# Patient Record
Sex: FEMALE | Race: BLACK OR AFRICAN AMERICAN | Employment: UNEMPLOYED | URBAN - METROPOLITAN AREA
[De-identification: names, ages, dates, MRNs, and addresses within clinical notes are randomized per-mention and may not be internally consistent; named-entity substitution may affect disease eponyms.]

---

## 2017-04-18 ENCOUNTER — OP HISTORICAL/CONVERTED ENCOUNTER (OUTPATIENT)
Dept: OTHER | Age: 57
End: 2017-04-18

## 2017-05-15 ENCOUNTER — OP HISTORICAL/CONVERTED ENCOUNTER (OUTPATIENT)
Dept: OTHER | Age: 57
End: 2017-05-15

## 2017-06-11 ENCOUNTER — OP HISTORICAL/CONVERTED ENCOUNTER (OUTPATIENT)
Dept: OTHER | Age: 57
End: 2017-06-11

## 2017-11-20 ENCOUNTER — OP HISTORICAL/CONVERTED ENCOUNTER (OUTPATIENT)
Dept: OTHER | Age: 57
End: 2017-11-20

## 2018-09-27 ENCOUNTER — OP HISTORICAL/CONVERTED ENCOUNTER (OUTPATIENT)
Dept: OTHER | Age: 58
End: 2018-09-27

## 2018-10-02 ENCOUNTER — IP HISTORICAL/CONVERTED ENCOUNTER (OUTPATIENT)
Dept: OTHER | Age: 58
End: 2018-10-02

## 2019-01-23 ENCOUNTER — OP HISTORICAL/CONVERTED ENCOUNTER (OUTPATIENT)
Dept: OTHER | Age: 59
End: 2019-01-23

## 2019-11-07 ENCOUNTER — ED HISTORICAL/CONVERTED ENCOUNTER (OUTPATIENT)
Dept: OTHER | Age: 59
End: 2019-11-07

## 2019-12-02 ENCOUNTER — OP HISTORICAL/CONVERTED ENCOUNTER (OUTPATIENT)
Dept: OTHER | Age: 59
End: 2019-12-02

## 2020-01-21 ENCOUNTER — OP HISTORICAL/CONVERTED ENCOUNTER (OUTPATIENT)
Dept: OTHER | Age: 60
End: 2020-01-21

## 2020-01-22 ENCOUNTER — ED HISTORICAL/CONVERTED ENCOUNTER (OUTPATIENT)
Dept: OTHER | Age: 60
End: 2020-01-22

## 2020-09-16 ENCOUNTER — HOSPITAL ENCOUNTER (EMERGENCY)
Age: 60
Discharge: HOME | End: 2020-09-16
Attending: FAMILY MEDICINE
Payer: COMMERCIAL

## 2020-09-16 VITALS
HEIGHT: 63 IN | WEIGHT: 229.28 LBS | TEMPERATURE: 98.7 F | BODY MASS INDEX: 40.62 KG/M2 | RESPIRATION RATE: 18 BRPM | SYSTOLIC BLOOD PRESSURE: 177 MMHG | OXYGEN SATURATION: 97 % | DIASTOLIC BLOOD PRESSURE: 83 MMHG | HEART RATE: 66 BPM

## 2020-09-16 DIAGNOSIS — S16.1XXA ACUTE CERVICAL MYOFASCIAL STRAIN, INITIAL ENCOUNTER: ICD-10-CM
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
## 2020-09-16 PROCEDURE — 99283 EMERGENCY DEPT VISIT LOW MDM: CPT

## 2020-09-16 PROCEDURE — 96372 THER/PROPH/DIAG INJ SC/IM: CPT

## 2020-09-16 PROCEDURE — 74011250636 HC RX REV CODE- 250/636: Performed by: FAMILY MEDICINE

## 2020-09-16 RX ORDER — ORPHENADRINE CITRATE 30 MG/ML
60 INJECTION INTRAMUSCULAR; INTRAVENOUS ONCE
Status: COMPLETED | OUTPATIENT
Start: 2020-09-16 | End: 2020-09-16

## 2020-09-16 RX ORDER — ORPHENADRINE CITRATE 100 MG/1
100 TABLET, EXTENDED RELEASE ORAL 2 TIMES DAILY
Qty: 30 TAB | Refills: 0 | Status: SHIPPED | OUTPATIENT
Start: 2020-09-16 | End: 2020-10-01

## 2020-09-16 RX ADMIN — ORPHENADRINE CITRATE 60 MG: 30 INJECTION INTRAMUSCULAR; INTRAVENOUS at 11:04
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
## 2020-09-17 NOTE — ED NOTES
Received multiple calls from patient that her prescription was not at the pharmacy. Called [facility] on [address] in [place], spoke with the pharmacist, advised that the Norflex prescribed by Dr. Anthony Almaguer was not covered under her insurance. Discussed with Dr. Sabine Fitch who is the physician on duty the problem with the RX. Prescription changed to Flexeril 10 mg one tid for 15 days dispense #45 with no refills given to pharmacist in place of the Norflex.  Patient notified of the change of the RX

## 2020-09-27 ENCOUNTER — HOSPITAL ENCOUNTER (EMERGENCY)
Age: 60
Discharge: HOME | End: 2020-09-27
Attending: INTERNAL MEDICINE
Payer: COMMERCIAL

## 2020-09-27 VITALS
HEIGHT: 63 IN | SYSTOLIC BLOOD PRESSURE: 177 MMHG | BODY MASS INDEX: 41.64 KG/M2 | WEIGHT: 235 LBS | HEART RATE: 80 BPM | OXYGEN SATURATION: 98 % | RESPIRATION RATE: 16 BRPM | DIASTOLIC BLOOD PRESSURE: 105 MMHG | TEMPERATURE: 98.5 F

## 2020-09-27 DIAGNOSIS — M54.2 NECK PAIN: Primary | ICD-10-CM

## 2020-09-27 PROCEDURE — 99282 EMERGENCY DEPT VISIT SF MDM: CPT

## 2020-09-27 RX ORDER — HYDROCODONE BITARTRATE AND ACETAMINOPHEN 5; 325 MG/1; MG/1
1 TABLET ORAL
Qty: 12 TAB | Refills: 0 | Status: SHIPPED | OUTPATIENT
Start: 2020-09-27 | End: 2020-09-30
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
## 2020-11-11 ENCOUNTER — APPOINTMENT (OUTPATIENT)
Dept: PHYSICAL THERAPY | Age: 60
End: 2020-11-11
Payer: MEDICARE

## 2020-11-13 ENCOUNTER — HOSPITAL ENCOUNTER (OUTPATIENT)
Dept: PHYSICAL THERAPY | Age: 60
Discharge: HOME | End: 2020-11-13
Payer: MEDICARE

## 2020-11-13 PROCEDURE — 97163 PT EVAL HIGH COMPLEX 45 MIN: CPT
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
## 2020-11-24 ENCOUNTER — APPOINTMENT (OUTPATIENT)
Dept: PHYSICAL THERAPY | Age: 60
End: 2020-11-24
Payer: MEDICARE

## 2020-11-27 ENCOUNTER — HOSPITAL ENCOUNTER (OUTPATIENT)
Dept: PHYSICAL THERAPY | Age: 60
Discharge: HOME | End: 2020-11-27
Payer: MEDICARE

## 2020-11-27 PROCEDURE — 97140 MANUAL THERAPY 1/> REGIONS: CPT

## 2020-11-27 PROCEDURE — 97110 THERAPEUTIC EXERCISES: CPT

## 2020-11-27 NOTE — PROGRESS NOTES
PT DAILY TREATMENT NOTE - [facility]     Patient Name: Quinn Looney  Date:2020  : 1960  [x]  Patient  Verified  Payor: [facility] MEDICARE / Plan: [facility] CCP / Product Type: Managed Care Medicare /    Treatment Area: Low back pain [M54.5]   Next MD APPT:   In time:11:33  Out time:12:23pm  Total Treatment Time (min): 50  Total Timed Codes (min): 40  1:1 Treatment Time ( only): 40  Visit #: 2 Visit count could not be calculated. Make sure you are using a visit which is associated with an episode. SUBJECTIVE  Pain Level (0-10 scale) pre treatment: 6/10 Any medication changes, allergies to medications, adverse drug reactions, diagnosis change, or new procedure performed?: [x] No    [] Yes (see summary sheet for update)  Subjective functional status/changes:   [] No changes reported  Patient reports pain localized to right side of her lower lumbar area. Patient report increased stiffness but she tries to move as much as she can at home. Patient presents with sight antalgic gait pattern, left side lean. OBJECTIVE    Modality rationale: decrease edema, decrease inflammation, decrease pain, increase tissue extensibility and increase muscle contraction/control to improve the patients ability to move lumbar with decrease pain.    Min Type Additional Details    [] Estim: []UnAtt   []Att       []TENS instruct                  []IFC  []Premod   []NMES                     []Other:  []w/US   []w/ice   []w/heat  Position:  Location:   10 []  Ice     [x]  Heat  []  Ice massage Position:left side lying   Location:Right lower lumbar area    []  Traction: [] Cervical       []Lumbar                       [] Prone          []Supine                       []Intermittent   []Continuous Lbs:  []w/heat  []W/heat and Estim    []  Ultrasound: []Continuous   [] Pulsed at:                           []1MHz   []3MHz Location:  W/cm2:      [] Skin assessment post-treatment:   []intact  []redness- no adverse reaction   []redness - adverse reaction:   30 min Therapeutic Exercise:  [x] See flow sheet :   Rationale: increase ROM, increase strength and improve coordination to improve the patients ability to move lumbar area without pain/discomofrt and reduce stiffness. 10 min Manual Therapy: Right lumbar area    Rationale: decrease pain, increase ROM, increase tissue extensibility and decrease trigger points to improve the patients ability to move back in pain free range. With   [x] TE   [] TA   [] neuro   [] other: Patient Education: [] Review HEP    [] Progressed/Changed HEP based on:   [] positioning   [] body mechanics   [] transfers   [] heat/ice application    [] other:      Other Objective/Functional Measures: There-ex were initiated  Patient reports mild discomfort on right lumbar area with PPT and required AA      Pain Level (0-10 scale) post treatment: 4-5/10      ASSESSMENT/Changes in Function:   Patient there-ex were initiated she required increased time and hand one with activities, patient hesitant of movement, reports some discomort and pulling sensation with some exercises, therapist progressed slowly and as tolerated. Patient tolerated gentle soft tissue mobilization to right lumbar area, increased trigger points and TTP noted from right side paraspinals L3-L5 area. Patient encouraged to do anterior trunk flex stretch at home, reports pain relief post heat. May benefit of incorporating wheel for lumbar stretch. Patient will continue to benefit from skilled PT services to modify and progress therapeutic interventions, address functional mobility deficits, address ROM deficits, address strength deficits, analyze and address soft tissue restrictions, analyze and cue movement patterns, analyze and modify body mechanics/ergonomics and assess and modify postural abnormalities to attain remaining goals.    []  See Plan of Care  []  See progress note/recertification  []  See Discharge Summary GOALS/Progress towards goals:    Short Term Goals: To be accomplished in 6 treatments:              1. Pt will be independent with HEP []? Met  []? Not Met []? Partially Met              2.  Decrease pain by at least 2 points on the VAS []? Met  []? Not Met []? Partially Met              3. Pt will demonstrate improved lumbar ROM in FF with decreased pain standing upright, Rotation and lateral flexion to minimal limitation []? Met  []? Not Met []? Partially Met                Long Term Goals: To be accomplished in 12 treatments:              1.  Restore Lumbar ROM to no more than minimal limitations without pain []? Met  []? Not Met []? Partially Met              2. Pt will be able to perform a SLR to at least 60 degrees without pain. []? Met  []? Not Met []? Partially Met              3. Pt will report improvement in ability to do IADLs without pain </=2/10 []? Met  []? Not Met []?  Partially Met     PLAN  []  Upgrade activities as tolerated     [x]  Continue plan of care  []  Update interventions per flow sheet       []  Discharge due to:_  []  Other:_      Candi Dailey, PT 11/27/2020

## 2020-12-01 ENCOUNTER — HOSPITAL ENCOUNTER (OUTPATIENT)
Dept: PHYSICAL THERAPY | Age: 60
Discharge: HOME | End: 2020-12-01
Payer: MEDICARE

## 2020-12-01 PROCEDURE — 97014 ELECTRIC STIMULATION THERAPY: CPT

## 2020-12-01 PROCEDURE — 97110 THERAPEUTIC EXERCISES: CPT

## 2020-12-01 PROCEDURE — 97140 MANUAL THERAPY 1/> REGIONS: CPT

## 2020-12-01 NOTE — PROGRESS NOTES
PT DAILY TREATMENT NOTE - [facility]     Patient Name: Yaneth Hernandez  Date:2020  : 1960  [x]  Patient  Verified  Payor: [facility] MEDICARE / Plan: [facility] [address] [facility] CCP / Product Type: Managed Care Medicare /    Treatment Area: Low back pain [M54.5]   Next MD APPT:   In time: 15:22pm  Out time:16:25pm  Total Treatment Time (min): 60  Total Timed Codes (min): 40  1:1 Treatment Time ( only): 40  Visit #: 2 Visit count could not be calculated. Make sure you are using a visit which is associated with an episode. SUBJECTIVE  Pain Level (0-10 scale) pre treatment: 7/10 Any medication changes, allergies to medications, adverse drug reactions, diagnosis change, or new procedure performed?: [x] No    [] Yes (see summary sheet for update)  Subjective functional status/changes:   [] No changes reported  Patient reports she was sore after last session and has pain with movement, but she's been trying to do her exercises. She also reports having stiffness in at night and in the mornings around her lower lumbar area. OBJECTIVE    Modality rationale: decrease edema, decrease inflammation, decrease pain, increase tissue extensibility and increase muscle contraction/control to improve the patients ability to move lumbar with decrease pain.    Min Type Additional Details   20 [x] Estim: [x]UnAtt   []Att       []TENS instruct                  []IFC  []Premod   []NMES                     []Other:  []w/US   []w/ice   []w/heat  Position:left side lying   Location:right lower lumbar area    []  Ice     []  Heat  []  Ice massage Position:  Location    []  Traction: [] Cervical       []Lumbar                       [] Prone          []Supine                       []Intermittent   []Continuous Lbs:  []w/heat  []W/heat and Estim    []  Ultrasound: []Continuous   [] Pulsed at:                           []1MHz   []3MHz Location:  W/cm2:      [] Skin assessment post-treatment:   []intact  []redness- no adverse reaction []redness - adverse reaction:   30 min Therapeutic Exercise:  [x] See flow sheet :   Rationale: increase ROM, increase strength and improve coordination to improve the patients ability to move lumbar area without pain/discomofrt and reduce stiffness. 10 min Manual Therapy: Right lumbar area    Rationale: decrease pain, increase ROM, increase tissue extensibility and decrease trigger points to improve the patients ability to move back in pain free range. With   [x] TE   [] TA   [] neuro   [] other: Patient Education: [] Review HEP    [] Progressed/Changed HEP based on:   [] positioning   [] body mechanics   [] transfers   [] heat/ice application    [] other:      Other Objective/Functional Measures:  Patient reports mild discomfort on right lumbar area with R SLR and right knee to chest.       Pain Level (0-10 scale) post treatment: 4/10      ASSESSMENT/Changes in Function:   Patient tolerated activities fairly well, continues to presents with decreased endurance on the bike with report of LE fatigue and requires occasional AA PPT and SLR for technique. Patient overall presents with general weakness and she requires time. Patient also still hesitant of movement, reports some discomort and pulling sensation with some exercises, therapist progressed slowly and as tolerated. Patient required assist with knee to chest and tolerated better activity in sidelying. Patient tolerated gentle soft tissue mobilization to right lumbar area, increased trigger points and TTP noted from right side paraspinals L3-L5 area. Patient encouraged to do anterior trunk flex stretch at home, reports pain relief post heat.  Patient will continue to benefit from skilled PT services to modify and progress therapeutic interventions, address functional mobility deficits, address ROM deficits, address strength deficits, analyze and address soft tissue restrictions, analyze and cue movement patterns, analyze and modify body mechanics/ergonomics and assess and modify postural abnormalities to attain remaining goals. []  See Plan of Care  []  See progress note/recertification  []  See Discharge Summary         GOALS/Progress towards goals:    Short Term Goals: To be accomplished in 6 treatments:              1. Pt will be independent with HEP []? Met  []? Not Met []? Partially Met              2.  Decrease pain by at least 2 points on the VAS []? Met  []? Not Met []? Partially Met              3. Pt will demonstrate improved lumbar ROM in FF with decreased pain standing upright, Rotation and lateral flexion to minimal limitation []? Met  []? Not Met []? Partially Met                Long Term Goals: To be accomplished in 12 treatments:              1.  Restore Lumbar ROM to no more than minimal limitations without pain []? Met  []? Not Met []? Partially Met              2. Pt will be able to perform a SLR to at least 60 degrees without pain. []? Met  []? Not Met []? Partially Met              3. Pt will report improvement in ability to do IADLs without pain </=2/10 []? Met  []? Not Met []?  Partially Met     PLAN  []  Upgrade activities as tolerated     [x]  Continue plan of care  []  Update interventions per flow sheet       []  Discharge due to:_  []  Other:_      Jossy Larry, PT 12/1/2020

## 2020-12-04 ENCOUNTER — HOSPITAL ENCOUNTER (OUTPATIENT)
Dept: PHYSICAL THERAPY | Age: 60
Discharge: HOME | End: 2020-12-04
Payer: MEDICARE

## 2020-12-04 PROCEDURE — 97110 THERAPEUTIC EXERCISES: CPT

## 2020-12-04 PROCEDURE — 97530 THERAPEUTIC ACTIVITIES: CPT

## 2020-12-04 PROCEDURE — 97140 MANUAL THERAPY 1/> REGIONS: CPT

## 2020-12-04 PROCEDURE — 97014 ELECTRIC STIMULATION THERAPY: CPT

## 2020-12-04 NOTE — PROGRESS NOTES
PT DAILY TREATMENT NOTE - [facility]     Patient Name: Ermias Doing  Date:2020  : 1960  [x]  Patient  Verified  Payor: [facility] / Plan: [facility] [address] [facility] CCP / Product Type: Managed Care Medicare /    Treatment Area: Low back pain [M54.5]   Next MD APPT:   In time: 15:16pm  Out time:16:20 pm  Total Treatment Time (min): 60  Total Timed Codes (min): 40  1:1 Treatment Time ( only): 40  Visit #: 2 Visit count could not be calculated. Make sure you are using a visit which is associated with an episode. SUBJECTIVE  Pain Level (0-10 scale) pre treatment: 4/10 Any medication changes, allergies to medications, adverse drug reactions, diagnosis change, or new procedure performed?: [x] No    [] Yes (see summary sheet for update)  Subjective functional status/changes:   [] No changes reported  Patient stated she is feeling better, pain is subsiding also stated the stiffness is subsiding and she is not feeling that bad. Stated she was able to get on the bike at home and was having less discomfort. OBJECTIVE    Modality rationale: decrease edema, decrease inflammation, decrease pain, increase tissue extensibility and increase muscle contraction/control to improve the patients ability to move lumbar with decrease pain.    Min Type Additional Details   20 [x] Estim: [x]UnAtt   []Att       []TENS instruct                  []IFC  []Premod   []NMES                     []Other:  []w/US   []w/ice   []w/heat  Position:left side lying   Location:right lower lumbar area    []  Ice     []  Heat  []  Ice massage Position:  Location    []  Traction: [] Cervical       []Lumbar                       [] Prone          []Supine                       []Intermittent   []Continuous Lbs:  []w/heat  []W/heat and Estim    []  Ultrasound: []Continuous   [] Pulsed at:                           []1MHz   []3MHz Location:  W/cm2:      [] Skin assessment post-treatment:   []intact  []redness- no adverse reaction []redness - adverse reaction:   30 min Therapeutic Exercise:  [x] See flow sheet :   Rationale: increase ROM, increase strength and improve coordination to improve the patients ability to move lumbar area without pain/discomofrt and reduce stiffness. 10 min Manual Therapy: Right lumbar area    Rationale: decrease pain, increase ROM, increase tissue extensibility and decrease trigger points to improve the patients ability to move back in pain free range. With   [x] TE   [] TA   [] neuro   [] other: Patient Education: [] Review HEP    [] Progressed/Changed HEP based on:   [] positioning   [] body mechanics   [] transfers   [] heat/ice application    [] other:      Other Objective/Functional Measures:    Pain Level (0-10 scale) post treatment: 2-3/10      ASSESSMENT/Changes in Function:   Patient was more energetic today, tolerated bike and exercise with less discomfort stated she is feeling better and happy that pain is subsiding. Patient continues to present with difficulty with knee to chest on R>L side stated its more painful tolerated side lying knee to chest better. . Patient tolerated gentle soft tissue mobilization to right lumbar area, increased trigger points and TTP noted from right side paraspinals L3-L5 area and sacrum. Encouraged to do exercises and patient reports pain relief post heat and ES. Patient will continue to benefit from skilled PT services to modify and progress therapeutic interventions, address functional mobility deficits, address ROM deficits, address strength deficits, analyze and address soft tissue restrictions, analyze and cue movement patterns, analyze and modify body mechanics/ergonomics and assess and modify postural abnormalities to attain remaining goals. []  See Plan of Care  []  See progress note/recertification  []  See Discharge Summary         GOALS/Progress towards goals:    Short Term Goals:  To be accomplished in 6 treatments:              1. Pt will be independent with HEP []? Met  []? Not Met []? Partially Met              2.  Decrease pain by at least 2 points on the VAS []? Met  []? Not Met []? Partially Met              3. Pt will demonstrate improved lumbar ROM in FF with decreased pain standing upright, Rotation and lateral flexion to minimal limitation []? Met  []? Not Met []? Partially Met                Long Term Goals: To be accomplished in 12 treatments:              1.  Restore Lumbar ROM to no more than minimal limitations without pain []? Met  []? Not Met []? Partially Met              2. Pt will be able to perform a SLR to at least 60 degrees without pain. []? Met  []? Not Met []? Partially Met              3. Pt will report improvement in ability to do IADLs without pain </=2/10 []? Met  []? Not Met []?  Partially Met     PLAN  []  Upgrade activities as tolerated     [x]  Continue plan of care  []  Update interventions per flow sheet       []  Discharge due to:_  []  Other:_      Rakesh Restrepo, PT 12/4/2020

## 2020-12-08 ENCOUNTER — HOSPITAL ENCOUNTER (OUTPATIENT)
Dept: PHYSICAL THERAPY | Age: 60
Discharge: HOME | End: 2020-12-08
Payer: MEDICARE

## 2020-12-08 PROCEDURE — 97110 THERAPEUTIC EXERCISES: CPT

## 2020-12-08 PROCEDURE — 97140 MANUAL THERAPY 1/> REGIONS: CPT

## 2020-12-08 PROCEDURE — 97014 ELECTRIC STIMULATION THERAPY: CPT

## 2020-12-08 NOTE — PROGRESS NOTES
PT DAILY TREATMENT NOTE - [facility]     Patient Name: Augustus Lennox  Date:2020  : 1960  [x]  Patient  Verified  Payor: [facility] MEDICARE / Plan: [facility] [address] [facility] CCP / Product Type: Managed Care Medicare /    Treatment Area: Low back pain [M54.5]   Next MD APPT:   In time: 1:03 pm  Out time:2:05 pm  Total Treatment Time (min): 62  Total Timed Codes (min): 40  1:1 Treatment Time ( only): 40  Visit #: 2 Visit count could not be calculated. Make sure you are using a visit which is associated with an episode. SUBJECTIVE  Pain Level (0-10 scale) pre treatment: 4/10 Any medication changes, allergies to medications, adverse drug reactions, diagnosis change, or new procedure performed?: [x] No    [] Yes (see summary sheet for update)  Subjective functional status/changes:   [] No changes reported  Patient stated she is feeling better, she has some times good day and some times bad days but overall she reports about 70% improvement since start of therapy. OBJECTIVE    Modality rationale: decrease edema, decrease inflammation, decrease pain, increase tissue extensibility and increase muscle contraction/control to improve the patients ability to move lumbar with decrease pain.    Min Type Additional Details   20 [x] Estim: [x]UnAtt   []Att       []TENS instruct                  []IFC  []Premod   []NMES                     []Other:  []w/US   []w/ice   []w/heat  Position:left side lying   Location:right lower lumbar area    []  Ice     []  Heat  []  Ice massage Position:  Location    []  Traction: [] Cervical       []Lumbar                       [] Prone          []Supine                       []Intermittent   []Continuous Lbs:  []w/heat  []W/heat and Estim    []  Ultrasound: []Continuous   [] Pulsed at:                           []1MHz   []3MHz Location:  W/cm2:      [] Skin assessment post-treatment:   []intact  []redness- no adverse reaction   []redness - adverse reaction:   30 min Therapeutic Exercise:  [x] See flow sheet :   Rationale: increase ROM, increase strength and improve coordination to improve the patients ability to move lumbar area without pain/discomofrt and reduce stiffness. 10 min Manual Therapy: Right lumbar area    Rationale: decrease pain, increase ROM, increase tissue extensibility and decrease trigger points to improve the patients ability to move back in pain free range. With   [x] TE   [] TA   [] neuro   [] other: Patient Education: [] Review HEP    [] Progressed/Changed HEP based on:   [] positioning   [] body mechanics   [] transfers   [] heat/ice application    [] other:      Other Objective/Functional Measures:  Difficulty with bridging     Physical Findings      Gait and Functional Mobility: more recipcal gait pattern  Palpation: TTP with LT throughout the R Gluteal area and LS paraspinals   Gross findings: Morbid obesity  Spine:   TRUNK ROM:      Flexion:                                  []? N/A  []? WNL  [x]? Minimal  []? Moderate  []? Major coming down on right side  Extension:                             []? N/A  []? WNL  []? Minimal  []? Moderate  [x]? Major    Right Lateral Flexion:           []? N/A  []? WNL  [x]? Minimal  []? Moderate  []? Major    Left Lateral Flexion:              []? N/A  []? WNL  []? Minimal  [x]? Moderate  []? Major   Rotation to the Right:           []? N/A  []? WNL  []? Minimal  [x]? Moderate  []? Major   Rotation to the Left:              []? N/A  []? WNL  [x]? Minimal  []? Moderate  []? Major   Right Side Glide:                   []? N/A  []? WNL  []? Minimal  []? Moderate  []? Major  Left Side Glide:                     []? N/A  []? WNL  []? Minimal  []? Moderate  []? Major  Additional comments: increased pain with flexion, left >right rotation and left side bending Improvement when  returning to upright from flexed position .   With FF pt deviated to the L    Specific joints: *normal values in ()     Hip                                AROM PROM                              MMT    R L R L R L   Flexion (120)         4  4   Extension (15)          2+  2+   Abduction (40)         3+P!  4   Adduction (30)          2-  2-   IR (40)               ER (40)               Additional comments: Increased hip strength noted, bridging is difficult but she is able to lift glut off mat, able to go onto prone and noted improvement in scooting.        Knee                                 AROM                          PROM                           MMT    R L R L R L   Extension (0)          4+ 5   Flexion (145)          4-  4-    Additional comments: Decreased effort on the R due to pain     Ankle                                 AROM                       PROM                             MMT    R L R L R L   Dorsiflexion (15)         4+ 5   Plantarflexion (50)               Additional comments:            Mobility Assessment: rolling to R/L side with increased time but no AA requied on narrow splint      Pain Level (0-10 scale) post treatment: 2-3/10      ASSESSMENT/Changes in Function:   Patient endurance level is improving, she tolerated bike x 6 min without rest, her bed mobilty is improving and she doesn't require assit for positioning on mat. Noted overall improvement with strength since evaluation however, she continues to presents with LE hip weakness on R>L, specially hip extensors. Patient continues to present with difficulty with knee to chest on R>L side stated its more painful tolerated side lying knee to chest better. . Patient tolerated gentle soft tissue mobilization to right lumbar/glut  area, increased trigger points and TTP noted from right side paraspinals L3-L5 area to sacrum. Encouraged to do exercises and patient reports pain relief post heat and ES.  Patient will continue to benefit from skilled PT services to modify and progress therapeutic interventions, address functional mobility deficits, address ROM deficits, address strength deficits, analyze and address soft tissue restrictions, analyze and cue movement patterns, analyze and modify body mechanics/ergonomics and assess and modify postural abnormalities to attain remaining goals. []  See Plan of Care  []  See progress note/recertification  []  See Discharge Summary         GOALS/Progress towards goals:    Short Term Goals: To be accomplished in 6 treatments:              1. Pt will be independent with HEP []? Met  []? Not Met []? Partially Met              2.  Decrease pain by at least 2 points on the VAS []? Met  []? Not Met []? Partially Met              3. Pt will demonstrate improved lumbar ROM in FF with decreased pain standing upright, Rotation and lateral flexion to minimal limitation []? Met  []? Not Met []? Partially Met                Long Term Goals: To be accomplished in 12 treatments:              1.  Restore Lumbar ROM to no more than minimal limitations without pain []? Met  []? Not Met []? Partially Met              2. Pt will be able to perform a SLR to at least 60 degrees without pain. []? Met  []? Not Met []? Partially Met              3. Pt will report improvement in ability to do IADLs without pain </=2/10 []? Met  []? Not Met []?  Partially Met     PLAN  []  Upgrade activities as tolerated     [x]  Continue plan of care  []  Update interventions per flow sheet       []  Discharge due to:_  []  Other:_      Andi Barcenas, PT 12/8/2020

## 2020-12-11 ENCOUNTER — HOSPITAL ENCOUNTER (OUTPATIENT)
Dept: PHYSICAL THERAPY | Age: 60
Discharge: HOME | End: 2020-12-11
Payer: MEDICARE

## 2020-12-11 PROCEDURE — 97140 MANUAL THERAPY 1/> REGIONS: CPT

## 2020-12-11 PROCEDURE — 97014 ELECTRIC STIMULATION THERAPY: CPT

## 2020-12-11 PROCEDURE — 97110 THERAPEUTIC EXERCISES: CPT

## 2020-12-11 NOTE — PROGRESS NOTES
PT DAILY TREATMENT NOTE - [facility]     Patient Name: Fer Speaker  Date:2020  : 1960  [x]  Patient  Verified  Payor: Misael Rebolledo / Plan: [facility] [place] [address] [facility] / Product Type: Managed Care Medicare /    Treatment Area: Low back pain [M54.5]   Next MD APPT:   In time: 1:03 pm  Out time:2:10  pm  Total Treatment Time (min): 65  Total Timed Codes (min): 45  1:1 Treatment Time ( only): 45  Visit #: 6 Visit count could not be calculated. Make sure you are using a visit which is associated with an episode. SUBJECTIVE  Pain Level (0-10 scale) pre treatment: 4/10 Any medication changes, allergies to medications, adverse drug reactions, diagnosis change, or new procedure performed?: [x] No    [] Yes (see summary sheet for update)  Subjective functional status/changes:   [] No changes reported  Patient stated she feels better, pain is about the same but not as sever as before, stated he neck still hurts and she will go to the MD to get a prescription since she's been calling the office and is not able to get through. OBJECTIVE    Modality rationale: decrease edema, decrease inflammation, decrease pain, increase tissue extensibility and increase muscle contraction/control to improve the patients ability to move lumbar with decrease pain.    Min Type Additional Details   20 [x] Estim: [x]UnAtt   []Att       []TENS instruct                  []IFC  []Premod   []NMES                     []Other:  []w/US   []w/ice   [x]w/heat  Position:left side lying   Location:right lower lumbar area    []  Ice     []  Heat  []  Ice massage Position:  Location    []  Traction: [] Cervical       []Lumbar                       [] Prone          []Supine                       []Intermittent   []Continuous Lbs:  []w/heat  []W/heat and Estim    []  Ultrasound: []Continuous   [] Pulsed at:                           []1MHz   []3MHz Location:  W/cm2:      [] Skin assessment post-treatment:   []intact  []redness- no adverse reaction   []redness - adverse reaction:   25 min Therapeutic Exercise:  [x] See flow sheet :   Rationale: increase ROM, increase strength and improve coordination to improve the patients ability to move lumbar area without pain/discomofrt and reduce stiffness. 20 min Manual Therapy: Right lumbar area    Rationale: decrease pain, increase ROM, increase tissue extensibility and decrease trigger points to improve the patients ability to move back in pain free range. With   [x] TE   [] TA   [] neuro   [] other: Patient Education: [] Review HEP    [] Progressed/Changed HEP based on:   [] positioning   [] body mechanics   [] transfers   [] heat/ice application    [] other:      Other Objective/Functional Measures:      Pain Level (0-10 scale) post treatment: 3/10      ASSESSMENT/Changes in Function:   Patient was having more discomfort with exercises today on the right side was not able to tolerate much of exercises, we focused more on the soft tissue mobilization. Then she realized she didn't take her pain medication before therapy today. Patient was encouraged to use pain meds prior to therapy to be able and tolerated exercises. Patient overall continues to presents with LE hip weakness on R>L, specially hip extensors. Patient continues to present with difficulty with knee to chest on R>L side stated its more painful. Patient tolerated gentle soft tissue mobilization to right lumbar/glut  area, increased trigger points and TTP noted from right side paraspinals L3-L5 area to sacrum. Encouraged to do exercises and patient reports pain relief post heat and ES.  Patient will continue to benefit from skilled PT services to modify and progress therapeutic interventions, address functional mobility deficits, address ROM deficits, address strength deficits, analyze and address soft tissue restrictions, analyze and cue movement patterns, analyze and modify body mechanics/ergonomics and assess and modify postural abnormalities to attain remaining goals. []  See Plan of Care  []  See progress note/recertification  []  See Discharge Summary         GOALS/Progress towards goals:    Short Term Goals: To be accomplished in 6 treatments:              1. Pt will be independent with HEP [x]? Met  []? Not Met []? Partially Met              2.  Decrease pain by at least 2 points on the VAS [x]? Met  []? Not Met []? Partially Met              3. Pt will demonstrate improved lumbar ROM in FF with decreased pain standing upright, Rotation and lateral flexion to minimal limitation []? Met  []? Not Met [x]? Partially Met                Long Term Goals: To be accomplished in 12 treatments:              1.  Restore Lumbar ROM to no more than minimal limitations without pain []? Met  []? Not Met []? Partially Met              2. Pt will be able to perform a SLR to at least 60 degrees without pain. []? Met  []? Not Met []? Partially Met              3. Pt will report improvement in ability to do IADLs without pain </=2/10 []? Met  []? Not Met []?  Partially Met     PLAN  []  Upgrade activities as tolerated     [x]  Continue plan of care  []  Update interventions per flow sheet       []  Discharge due to:_  []  Other:_      Adela Martinez, PT 12/11/2020

## 2020-12-15 ENCOUNTER — HOSPITAL ENCOUNTER (OUTPATIENT)
Dept: PHYSICAL THERAPY | Age: 60
Discharge: HOME | End: 2020-12-15
Payer: MEDICARE

## 2020-12-15 PROCEDURE — 97014 ELECTRIC STIMULATION THERAPY: CPT

## 2020-12-15 PROCEDURE — 97140 MANUAL THERAPY 1/> REGIONS: CPT

## 2020-12-15 PROCEDURE — 97110 THERAPEUTIC EXERCISES: CPT

## 2020-12-15 NOTE — PROGRESS NOTES
PT DAILY TREATMENT NOTE - [facility]     Patient Name: Beni Edmonds  Date:12/15/2020  : 1960  [x]  Patient  Verified  Payor: Luigi Gutierrez / Plan: [facility] [place] [address] [facility] CCP / Product Type: Managed Care Medicare /    Treatment Area: Low back pain [M54.5]   Next MD APPT:   In time: 1:03 pm  Out time:2:10  pm  Total Treatment Time (min): 65  Total Timed Codes (min): 45  1:1 Treatment Time ( only): 45  Visit #: 6 Visit count could not be calculated. Make sure you are using a visit which is associated with an episode. SUBJECTIVE  Pain Level (0-10 scale) pre treatment: 4/10 Any medication changes, allergies to medications, adverse drug reactions, diagnosis change, or new procedure performed?: [x] No    [] Yes (see summary sheet for update)  Subjective functional status/changes:   [] No changes reported  Patient stated she feels better today, she took her pain med this morning and has not much discomfort. OBJECTIVE    Modality rationale: decrease edema, decrease inflammation, decrease pain, increase tissue extensibility and increase muscle contraction/control to improve the patients ability to move lumbar with decrease pain.    Min Type Additional Details   20 [x] Estim: [x]UnAtt   []Att       []TENS instruct                  []IFC  []Premod   []NMES                     []Other:  []w/US   []w/ice   [x]w/heat  Position:left side lying   Location:right lower lumbar area    []  Ice     []  Heat  []  Ice massage Position:  Location    []  Traction: [] Cervical       []Lumbar                       [] Prone          []Supine                       []Intermittent   []Continuous Lbs:  []w/heat  []W/heat and Estim    []  Ultrasound: []Continuous   [] Pulsed at:                           []1MHz   []3MHz Location:  W/cm2:      [] Skin assessment post-treatment:   []intact  []redness- no adverse reaction   []redness - adverse reaction:   35 min Therapeutic Exercise:  [x] See flow sheet :   Rationale: increase ROM, increase strength and improve coordination to improve the patients ability to move lumbar area without pain/discomofrt and reduce stiffness. 10 min Manual Therapy: Right lumbar area    Rationale: decrease pain, increase ROM, increase tissue extensibility and decrease trigger points to improve the patients ability to move back in pain free range. With   [x] TE   [] TA   [] neuro   [] other: Patient Education: [] Review HEP    [] Progressed/Changed HEP based on:   [] positioning   [] body mechanics   [] transfers   [] heat/ice application    [] other:      Other Objective/Functional Measures:      Pain Level (0-10 scale) post treatment: 2-3/10      ASSESSMENT/Changes in Function:   Patient tolerated activities better today, had less discomfort with exercises and less pain due to taking her pain meds. Patient progressed to more standing stabilization exercises report mild discomfort in back but it was not limiting her mobility. Patient continues to present with trigger points on right lower lumbar area L3-L5 however they are subsisting . Patient overall continues to presents with LE hip weakness on R>L, specially hip extensors. Patient was able to perform a knee to chest better today. Encouraged to do exercises and  Stretches at home, HEP with flexor forward stretch, LTR and knee to chest provided. Patient reports pain relief post heat and ES. Patient will continue to benefit from skilled PT services to modify and progress therapeutic interventions, address functional mobility deficits, address ROM deficits, address strength deficits, analyze and address soft tissue restrictions, analyze and cue movement patterns, analyze and modify body mechanics/ergonomics and assess and modify postural abnormalities to attain remaining goals. []  See Plan of Care  []  See progress note/recertification  []  See Discharge Summary         GOALS/Progress towards goals:    Short Term Goals:  To be accomplished in 6 treatments:              1. Pt will be independent with HEP [x]? Met  []? Not Met []? Partially Met              2.  Decrease pain by at least 2 points on the VAS [x]? Met  []? Not Met []? Partially Met              3. Pt will demonstrate improved lumbar ROM in FF with decreased pain standing upright, Rotation and lateral flexion to minimal limitation []? Met  []? Not Met [x]? Partially Met                Long Term Goals: To be accomplished in 12 treatments:              1.  Restore Lumbar ROM to no more than minimal limitations without pain []? Met  []? Not Met []? Partially Met              2. Pt will be able to perform a SLR to at least 60 degrees without pain. []? Met  []? Not Met []? Partially Met              3. Pt will report improvement in ability to do IADLs without pain </=2/10 []? Met  []? Not Met []?  Partially Met     PLAN  []  Upgrade activities as tolerated     [x]  Continue plan of care  []  Update interventions per flow sheet       []  Discharge due to:_  []  Other:_      Dylon Brown, PT 12/15/2020

## 2020-12-18 ENCOUNTER — HOSPITAL ENCOUNTER (OUTPATIENT)
Dept: PHYSICAL THERAPY | Age: 60
Discharge: HOME | End: 2020-12-18
Payer: MEDICARE

## 2020-12-18 PROCEDURE — 97014 ELECTRIC STIMULATION THERAPY: CPT

## 2020-12-18 PROCEDURE — 97110 THERAPEUTIC EXERCISES: CPT

## 2020-12-18 PROCEDURE — 97140 MANUAL THERAPY 1/> REGIONS: CPT

## 2020-12-18 NOTE — PROGRESS NOTES
PT DAILY TREATMENT NOTE - [facility]     Patient Name: Yaneth Hernandez  Date:2020  : 1960  [x]  Patient  Verified  Payor: [facility] MEDICARE / Plan: [facility] [place] [address] [facility] CCP / Product Type: Managed Care Medicare /    Treatment Area: Low back pain [M54.5]   Next MD APPT:   In time: 1:01pm  Out time:2:05 pm  Total Treatment Time (min): 60  Total Timed Codes (min): 40  1:1 Treatment Time ( only): 40  Visit #: 6 Visit count could not be calculated. Make sure you are using a visit which is associated with an episode. SUBJECTIVE  Pain Level (0-10 scale) pre treatment: 3/10 Any medication changes, allergies to medications, adverse drug reactions, diagnosis change, or new procedure performed?: [x] No    [] Yes (see summary sheet for update)  Subjective functional status/changes:   [] No changes reported  Patient reports doing better and pain is subsiding but she still took a pain medication this morning. OBJECTIVE    Modality rationale: decrease edema, decrease inflammation, decrease pain, increase tissue extensibility and increase muscle contraction/control to improve the patients ability to move lumbar with decrease pain.    Min Type Additional Details   20 [x] Estim: [x]UnAtt   []Att       []TENS instruct                  []IFC  []Premod   []NMES                     []Other:  []w/US   []w/ice   [x]w/heat  Position:left side lying   Location:right lower lumbar area    []  Ice     []  Heat  []  Ice massage Position:  Location    []  Traction: [] Cervical       []Lumbar                       [] Prone          []Supine                       []Intermittent   []Continuous Lbs:  []w/heat  []W/heat and Estim    []  Ultrasound: []Continuous   [] Pulsed at:                           []1MHz   []3MHz Location:  W/cm2:      [] Skin assessment post-treatment:   []intact  []redness- no adverse reaction   []redness - adverse reaction:   30 min Therapeutic Exercise:  [x] See flow sheet :   Rationale: increase ROM, increase strength and improve coordination to improve the patients ability to move lumbar area without pain/discomofrt and reduce stiffness. 10 min Manual Therapy: Right lumbar area    Rationale: decrease pain, increase ROM, increase tissue extensibility and decrease trigger points to improve the patients ability to move back in pain free range. With   [x] TE   [] TA   [] neuro   [] other: Patient Education: [] Review HEP    [] Progressed/Changed HEP based on:   [] positioning   [] body mechanics   [] transfers   [] heat/ice application    [] other:      Other Objective/Functional Measures:    Added more standing exercises  SLS x    L LE 10sec   R LE 4sec   SLR on L >80, R 70 deg     Pain Level (0-10 scale) post treatment: 1-2/10      ASSESSMENT/Changes in Function:   Patient tolerated session fairly well, she was able to do most of the standing stabilization/strengthening exercises with mild lower back discomfort on right side. Patient presents with less trigger points on right lower lumbar area L3-L5 and reports relief following rock toll. Patient overall continues to presents with LE hip weakness on R>L, specially hip extensors. Patient was able to perform a knee with mild back discomfort in sidelying. Encouraged to do exercises and  Stretches at home. Patient reports pain relief post heat and ES. Patient will continue to benefit from skilled PT services to modify and progress therapeutic interventions, address functional mobility deficits, address ROM deficits, address strength deficits, analyze and address soft tissue restrictions, analyze and cue movement patterns, analyze and modify body mechanics/ergonomics and assess and modify postural abnormalities to attain remaining goals. []  See Plan of Care  []  See progress note/recertification  []  See Discharge Summary         GOALS/Progress towards goals:    Short Term Goals:  To be accomplished in 6 treatments:              1. Pt will be independent with HEP [x]? Met  []? Not Met []? Partially Met              2.  Decrease pain by at least 2 points on the VAS [x]? Met  []? Not Met []? Partially Met              3. Pt will demonstrate improved lumbar ROM in FF with decreased pain standing upright, Rotation and lateral flexion to minimal limitation []? Met  []? Not Met [x]? Partially Met                Long Term Goals: To be accomplished in 12 treatments:              1.  Restore Lumbar ROM to no more than minimal limitations without pain []? Met  []? Not Met []? Partially Met              2. Pt will be able to perform a SLR to at least 60 degrees without pain. [x]? Met  []? Not Met []? Partially Met              3. Pt will report improvement in ability to do IADLs without pain </=2/10 []? Met  []? Not Met []?  Partially Met     PLAN  []  Upgrade activities as tolerated     [x]  Continue plan of care  []  Update interventions per flow sheet       []  Discharge due to:_  []  Other:_      Josiane Garcia, PT 12/18/2020

## 2020-12-22 ENCOUNTER — HOSPITAL ENCOUNTER (OUTPATIENT)
Dept: PHYSICAL THERAPY | Age: 60
Discharge: HOME | End: 2020-12-22
Payer: MEDICARE

## 2020-12-22 PROCEDURE — 97140 MANUAL THERAPY 1/> REGIONS: CPT

## 2020-12-22 PROCEDURE — 97110 THERAPEUTIC EXERCISES: CPT

## 2020-12-22 PROCEDURE — 97014 ELECTRIC STIMULATION THERAPY: CPT

## 2020-12-22 NOTE — PROGRESS NOTES
PT DAILY TREATMENT NOTE - [facility]     Patient Name: Luis Castillo  Date:2020  : 1960  [x]  Patient  Verified  Payor: Eusebio Anam / Plan: [facility] [address] [facility] CCP / Product Type: Managed Care Medicare /    Treatment Area: Low back pain [M54.5]   Next MD APPT:   In time: 4:30 pm  Out time:5:35 pm  Total Treatment Time (min): 65 min  Total Timed Codes (min): 45min  1:1 Treatment Time ( only): 45min  Visit #: 9 Visit count could not be calculated. Make sure you are using a visit which is associated with an episode. SUBJECTIVE  Pain Level (0-10 scale) pre treatment: 3/10 Any medication changes, allergies to medications, adverse drug reactions, diagnosis change, or new procedure performed?: [x] No    [] Yes (see summary sheet for update)  Subjective functional status/changes:   [] No changes reported    Patient reports she is doing better and her pain is subsiding but she still took a pain medication this morning. OBJECTIVE    Modality rationale: decrease edema, decrease inflammation, decrease pain, increase tissue extensibility and increase muscle contraction/control to improve the patients ability to move lumbar with decrease pain.    Min Type Additional Details   20 [x] Estim: [x]UnAtt   []Att       []TENS instruct                  []IFC  []Premod   []NMES                     []Other:  []w/US   []w/ice   [x]w/heat  Position:left side lying   Location:right lower lumbar area    []  Ice     []  Heat  []  Ice massage Position:  Location    []  Traction: [] Cervical       []Lumbar                       [] Prone          []Supine                       []Intermittent   []Continuous Lbs:  []w/heat  []W/heat and Estim    []  Ultrasound: []Continuous   [] Pulsed at:                           []1MHz   []3MHz Location:  W/cm2:      [] Skin assessment post-treatment:   []intact  []redness- no adverse reaction   []redness - adverse reaction:   35 min Therapeutic Exercise:  [x] See flow sheet : Rationale: increase ROM, increase strength and improve coordination to improve the patients ability to move lumbar area without pain/discomofrt and reduce stiffness. 10 min Manual Therapy: Right lumbar area    Rationale: decrease pain, increase ROM, increase tissue extensibility and decrease trigger points to improve the patients ability to move back in pain free range. With   [x] TE   [] TA   [] neuro   [] other: Patient Education: [] Review HEP    [] Progressed/Changed HEP based on:   [] positioning   [] body mechanics   [] transfers   [] heat/ice application    [] other:      Other Objective/Functional Measures:    Noted increased left side lying with WB exercises. TTP on right L3-L5  Mild discomfort on her right lower back with bridges  Noted mild improved with R SLR     Pain Level (0-10 scale) post treatment: 1/10 (reports feeling a lot better)      ASSESSMENT/Changes in Function:   Patient reports mild discomofrt with SLS on her lower back exercises, mild fatigue but able to do all activities. Patient presents with less trigger points on right lower lumbar area L3-L5 and reports relief following rock toll. Patient overall continues to presents with LE hip weakness on R>L, specially hip extensors. =  Encouraged to do exercises and  Stretches at home. Patient reports pain relief post heat and ES. Patient will continue to benefit from skilled PT services to modify and progress therapeutic interventions, address functional mobility deficits, address ROM deficits, address strength deficits, analyze and address soft tissue restrictions, analyze and cue movement patterns, analyze and modify body mechanics/ergonomics and assess and modify postural abnormalities to attain remaining goals. []  See Plan of Care  []  See progress note/recertification  []  See Discharge Summary         GOALS/Progress towards goals:    Short Term Goals:  To be accomplished in 6 treatments:              1. Pt will be independent with HEP [x]? Met  []? Not Met []? Partially Met              2.  Decrease pain by at least 2 points on the VAS [x]? Met  []? Not Met []? Partially Met              3. Pt will demonstrate improved lumbar ROM in FF with decreased pain standing upright, Rotation and lateral flexion to minimal limitation []? Met  []? Not Met [x]? Partially Met                Long Term Goals: To be accomplished in 12 treatments:              1.  Restore Lumbar ROM to no more than minimal limitations without pain []? Met  []? Not Met []? Partially Met              2. Pt will be able to perform a SLR to at least 60 degrees without pain. [x]? Met  []? Not Met []? Partially Met              3. Pt will report improvement in ability to do IADLs without pain </=2/10 []? Met  []? Not Met []?  Partially Met     PLAN  []  Upgrade activities as tolerated     [x]  Continue plan of care  []  Update interventions per flow sheet       []  Discharge due to:_  []  Other:_      Candi Dailey, PT 12/22/2020

## 2020-12-29 ENCOUNTER — HOSPITAL ENCOUNTER (OUTPATIENT)
Dept: PHYSICAL THERAPY | Age: 60
Discharge: HOME | End: 2020-12-29
Payer: MEDICARE

## 2020-12-29 PROCEDURE — 97140 MANUAL THERAPY 1/> REGIONS: CPT

## 2020-12-29 PROCEDURE — 97110 THERAPEUTIC EXERCISES: CPT

## 2020-12-29 NOTE — PROGRESS NOTES
PT DAILY TREATMENT NOTE - [facility]     Patient Name: Brian Mendez  Date:2020  : 1960  [x]  Patient  Verified  Payor: Armando Finnegan / Plan: [facility] CCP / Product Type: Managed Care Medicare /    Treatment Area: Low back pain [M54.5]   Next MD APPT:   In time: 1:55 pm  Out time: 2:40 pm  Total Treatment Time (min): 45 min  Total Timed Codes (min): 30min  1:1 Treatment Time ( only): 45min  Visit #: 10 Visit count could not be calculated. Make sure you are using a visit which is associated with an episode. SUBJECTIVE  Pain Level (0-10 scale) pre treatment: 3/10 Any medication changes, allergies to medications, adverse drug reactions, diagnosis change, or new procedure performed?: [x] No    [] Yes (see summary sheet for update)  Subjective functional status/changes:   [] No changes reported  Patient reports she is doing better and her pain is subsiding and she took a pain medication this morning. OBJECTIVE    Physical Findings   Ortho:   Posture: In standing R iliac crest is higher than the L   Gait and Functional Mobility: mild left lateral lean, slow ajay  Palpation: R Gluteal area and L3-L4 LS paraspinals   Gross findings: Morbid obesity  Spine:   TRUNK ROM:      Flexion:                                  []? N/A  [x]? WNL  []? Minimal  []? Moderate  []? Major   Extension:                             []? N/A  []? WNL  [x]? Minimal  []? Moderate  []? Major on right side    Right Lateral Flexion:           []? N/A  []? WNL  [x]? Minimal  []? Moderate  []? Major    Left Lateral Flexion:              []? N/A  [x]? WNL  []? Minimal  []? Moderate  []? Major   Rotation to the Right:           []? N/A  []? WNL  []? Minimal  []? Moderate  []? Major   Rotation to the Left:              []? N/A  []? WNL  [x]? Minimal  []? Moderate  []? Major on right side. Right Side Glide:                   []? N/A  []? WNL  []? Minimal  []? Moderate  []? Major  Left Side Glide:                     []? N/A  []? WNL []?Minimal  []? Moderate  []? Major  Additional comments:  L rotation and lateral flexion more painful in the R lumbar area. Pt has not difficulty returning to upright from flexed position . Specific joints: *normal values in ()     Hip                                AROM                            PROM                              MMT    R L R L R L   Flexion (120)         4+ 4P! Extension (15)          4-  4-   Abduction (40)         4  5   Adduction (30)          2-  2-    IR (40)               ER (40)               Additional comments: Patient still has weakness and difficulty bridging but not as painful as evaluation and she is able to scoot on the plinth and get into prone. Reports mild LBP with initialtion of seated hip flexion on left side only nothing on right. Passive SLR not painful. SLR to about 70 degrees, HS not tight.     Knee                                 AROM                          PROM                           MMT    R L R L R L   Extension (0)          5 5   Flexion (145) prone           4+  4+   Additional comments: Decreased effort on the R due to pain     Ankle                                 AROM                       PROM                             MMT    R L R L R L   Dorsiflexion (15)         5 5   Plantarflexion (50)               Additional comments:              Mobility Assessment: Independent with decreased difficulty trying to turn on narrow plinth. No discomfort with knee to chest         15 min Therapeutic Exercise:  [x] See flow sheet :   Rationale: increase ROM, increase strength and improve coordination to improve the patients ability to move lumbar area without pain/discomofrt and reduce stiffness. 15 min Manual Therapy: Right lumbar area    Rationale: decrease pain, increase ROM, increase tissue extensibility and decrease trigger points to improve the patients ability to move back in pain free range.              With   [x] TE   [] TA   [] neuro   [] other: Patient Education: [] Review HEP    [] Progressed/Changed HEP based on:   [] positioning   [] body mechanics   [] transfers   [] heat/ice application    [] other:      Other Objective/Functional Measures:    Reassessment performed   Pain Level (0-10 scale) post treatment: 2/10 (reports feeling a lot better)      ASSESSMENT/Changes in Function:   During today's assessment we noted improvement in lumbar ROM, overall she reports mild discomfort with right SB and left rotation but rest of movements were pain free. Patient strength in LEs also improve, she was more comfortable with her bed-mobility going from  Sit<->supine, supine<->prone, doing a SLR and knee to chest. Patient reports slightly tenderness and mild trigger points in LS however they did not hinder much for her movement. Patient continues to presents with LE hip weakness on R>L, specially hip extensors and she  encouraged to do exercises and  Stretches at home. Patient reports being compliant with exercises and stretches at home. Patient also stated she feels better at home and doing more of her ADL's was suggested to taper down her pain meds to once a day. Patient will continue to benefit from skilled PT services to modify and progress therapeutic interventions, address functional mobility deficits, address ROM deficits, address strength deficits, analyze and address soft tissue restrictions, analyze and cue movement patterns, analyze and modify body mechanics/ergonomics and assess and modify postural abnormalities to attain remaining goals. []  See Plan of Care  []  See progress note/recertification  []  See Discharge Summary         GOALS/Progress towards goals:    Short Term Goals: To be accomplished in 6 treatments:              1. Pt will be independent with HEP [x]? Met  []? Not Met []? Partially Met              2.  Decrease pain by at least 2 points on the VAS [x]? Met  []? Not Met []?  Partially Met              3. Pt will demonstrate improved lumbar ROM in FF with decreased pain standing upright, Rotation and lateral flexion to minimal limitation [x]? Met  []? Not Met []? Partially Met                Long Term Goals: To be accomplished in 12 treatments:              1.  Restore Lumbar ROM to no more than minimal limitations without pain []? Met  []? Not Met [x]? Partially Met              2. Pt will be able to perform a SLR to at least 60 degrees without pain. [x]? Met  []? Not Met []? Partially Met              3. Pt will report improvement in ability to do IADLs without pain </=2/10 []? Met  []? Not Met[x]?  Partially Met     PLAN  []  Upgrade activities as tolerated     [x]  Continue plan of care  []  Update interventions per flow sheet       []  Discharge due to:_  []  Other:_      Trice Roberts, PT 12/29/2020
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
## 2020-12-31 ENCOUNTER — HOSPITAL ENCOUNTER (OUTPATIENT)
Dept: PHYSICAL THERAPY | Age: 60
Discharge: HOME | End: 2020-12-31
Payer: MEDICARE

## 2020-12-31 PROCEDURE — 97140 MANUAL THERAPY 1/> REGIONS: CPT

## 2020-12-31 PROCEDURE — 97110 THERAPEUTIC EXERCISES: CPT

## 2020-12-31 NOTE — PROGRESS NOTES
PT DAILY TREATMENT NOTE - [facility]     Patient Name: Dick Young  Date:2020  : 1960  [x]  Patient  Verified  Payor: Anival Silva / Plan: [facility] CCP / Product Type: Managed Care Medicare /    Treatment Area: Low back pain [M54.5]   Next MD APPT:   In time: 1:20 pm  Out time: 2:05 pm  Total Treatment Time (min): 40 min  Total Timed Codes (min): 40min  1:1 Treatment Time ( only): 40min  Visit #: 10 Visit count could not be calculated. Make sure you are using a visit which is associated with an episode. SUBJECTIVE  Pain Level (0-10 scale) pre treatment: 2-3/10 Any medication changes, allergies to medications, adverse drug reactions, diagnosis change, or new procedure performed?: [x] No    [] Yes (see summary sheet for update)  Subjective functional status/changes:   [] No changes reported  Patient stated she feels well, her back pain is ok but she got a boil under her right breast which is very painful and she will try to do her best with therapy today. OBJECTIVE    30 min Therapeutic Exercise:  [x] See flow sheet :   Rationale: increase ROM, increase strength and improve coordination to improve the patients ability to move lumbar area without pain/discomofrt and reduce stiffness. 10 min Manual Therapy: Right lumbar area    Rationale: decrease pain, increase ROM, increase tissue extensibility and decrease trigger points to improve the patients ability to move back in pain free range. With   [x] TE   [] TA   [] neuro   [] other: Patient Education: [] Review HEP    [] Progressed/Changed HEP based on:   [] positioning   [] body mechanics   [] transfers   [] heat/ice application    [] other:      Other Objective/Functional Measures:    SLS L 10sec   R 9 sec after 3 attempts.      Pain Level (0-10 scale) post treatment: 2/10       ASSESSMENT/Changes in Function:   Modifed exercises to sitting/standing due to discomfort on right side of trunk secondary to boil and difficutly and increased pressure with supine positions. Patient was supporting her right breast with some sitting/standing exercices due to pain. Patient reports slightly tenderness and mild trigger points in LS however they did not hinder much for her movement. Patient will continue to benefit from skilled PT services to modify and progress therapeutic interventions, address functional mobility deficits, address ROM deficits, address strength deficits, analyze and address soft tissue restrictions, analyze and cue movement patterns, analyze and modify body mechanics/ergonomics and assess and modify postural abnormalities to attain remaining goals. []  See Plan of Care  []  See progress note/recertification  []  See Discharge Summary         GOALS/Progress towards goals:    Short Term Goals: To be accomplished in 6 treatments:              1. Pt will be independent with HEP [x]? Met  []? Not Met []? Partially Met              2.  Decrease pain by at least 2 points on the VAS [x]? Met  []? Not Met []? Partially Met              3. Pt will demonstrate improved lumbar ROM in FF with decreased pain standing upright, Rotation and lateral flexion to minimal limitation [x]? Met  []? Not Met []? Partially Met                Long Term Goals: To be accomplished in 12 treatments:              1.  Restore Lumbar ROM to no more than minimal limitations without pain []? Met  []? Not Met [x]? Partially Met              2. Pt will be able to perform a SLR to at least 60 degrees without pain. [x]? Met  []? Not Met []? Partially Met              3. Pt will report improvement in ability to do IADLs without pain </=2/10 []? Met  []? Not Met[x]?  Partially Met     PLAN  []  Upgrade activities as tolerated     [x]  Continue plan of care  []  Update interventions per flow sheet       []  Discharge due to:_  []  Other:_      Blanca Wells, PT 12/31/2020

## 2021-01-06 ENCOUNTER — HOSPITAL ENCOUNTER (OUTPATIENT)
Dept: PHYSICAL THERAPY | Age: 61
Discharge: HOME | End: 2021-01-06
Payer: COMMERCIAL

## 2021-01-06 PROCEDURE — 97140 MANUAL THERAPY 1/> REGIONS: CPT

## 2021-01-06 PROCEDURE — 97110 THERAPEUTIC EXERCISES: CPT

## 2021-01-06 NOTE — PROGRESS NOTES
PT DAILY TREATMENT NOTE - [facility]     Patient Name: Viviana Model  Date:2021  : 1960  [x]  Patient  Verified  Payor: [employer] / Plan: [facility] [place]  HMO / Product Type: Managed Care Medicare /    Treatment Area: Low back pain [M54.5]   Next MD APPT:   In time: 3:25 pm  Out time: 4:15 pm  Total Treatment Time (min): 50 min  Total Timed Codes (min): 50min  1:1 Treatment Time ( only): 50 min  Visit #: 1/15 Visit count could not be calculated. Make sure you are using a visit which is associated with an episode. SUBJECTIVE  Pain Level (0-10 scale) pre treatment: 2/10    Any medication changes, allergies to medications, adverse drug reactions, diagnosis change, or new procedure performed?: [x] No    [] Yes (see summary sheet for update)  Subjective functional status/changes:   [] No changes reported  Patient reports she moved this weekend and did a lot work, she was aware of her back but is sore today. Patient changed new insurance and requested to see if she could do few more sessions for her back. OBJECTIVE    35 min Therapeutic Exercise:  [x] See flow sheet :   Rationale: increase ROM, increase strength and improve coordination to improve the patients ability to move lumbar area without pain/discomofrt and reduce stiffness. 15 min Manual Therapy: Right lumbar area    Rationale: decrease pain, increase ROM, increase tissue extensibility and decrease trigger points to improve the patients ability to move back in pain free range.              With   [x] TE   [] TA   [] neuro   [] other: Patient Education: [] Review HEP    [] Progressed/Changed HEP based on:   [] positioning   [] body mechanics   [] transfers   [] heat/ice application    [] other:      Other Objective/Functional Measures:    Reports mild back pain with bridges   Slight TTP on L3-L4 with soft tissue mobilization     Pain Level (0-10 scale) post treatment: 0/10 post rock tool ASSESSMENT/Changes in Function:   Continue with standing core-stabilization exercises, patient requires more frequent rest breaks due to decreased endurance and fatigue. Patient reports slightly tenderness and mild trigger points in LS however they did not hinder much for her movement. Patient will continue few more sessions for the back and then she wants to come for her neck. Patient will continue to benefit from skilled PT services to modify and progress therapeutic interventions, address functional mobility deficits, address ROM deficits, address strength deficits, analyze and address soft tissue restrictions, analyze and cue movement patterns, analyze and modify body mechanics/ergonomics and assess and modify postural abnormalities to attain remaining goals. []  See Plan of Care  []  See progress note/recertification  []  See Discharge Summary         GOALS/Progress towards goals:    Short Term Goals: To be accomplished in 6 treatments:              1. Pt will be independent with HEP [x]? Met  []? Not Met []? Partially Met              2.  Decrease pain by at least 2 points on the VAS [x]? Met  []? Not Met []? Partially Met              3. Pt will demonstrate improved lumbar ROM in FF with decreased pain standing upright, Rotation and lateral flexion to minimal limitation [x]? Met  []? Not Met []? Partially Met                Long Term Goals: To be accomplished in 12 treatments:              1.  Restore Lumbar ROM to no more than minimal limitations without pain []? Met  []? Not Met [x]? Partially Met              2. Pt will be able to perform a SLR to at least 60 degrees without pain. [x]? Met  []? Not Met []? Partially Met              3. Pt will report improvement in ability to do IADLs without pain </=2/10 [x]? Met  []? Not Met[x]?  Partially Met     PLAN  []  Upgrade activities as tolerated     [x]  Continue plan of care  []  Update interventions per flow sheet       []  Discharge due to:_  [] Other:_      Laura Whittington, PT 1/6/2021

## 2021-01-12 ENCOUNTER — HOSPITAL ENCOUNTER (OUTPATIENT)
Dept: PHYSICAL THERAPY | Age: 61
Discharge: HOME | End: 2021-01-12
Payer: COMMERCIAL

## 2021-01-12 PROCEDURE — 97110 THERAPEUTIC EXERCISES: CPT

## 2021-01-12 PROCEDURE — 97140 MANUAL THERAPY 1/> REGIONS: CPT

## 2021-01-12 NOTE — PROGRESS NOTES
PT DAILY TREATMENT NOTE - MCR     Patient Name: Conchita Young  Date:2021  : 1960  [x]  Patient  Verified  Payor: BLUE CROSS MEDICARE / Plan: [facility] HMO / Product Type: Managed Care Medicare /    Treatment Area: Low back pain [M54.5]   Next MD APPT:   In time: 10:06 am  Out time: 10:55 am  Total Treatment Time (min): 49 min  Total Timed Codes (min): 49 min  1:1 Treatment Time ( only): 49 min  Visit #: 2/15 Visit count could not be calculated. Make sure you are using a visit which is associated with an episode. SUBJECTIVE  Pain Level (0-10 scale) pre treatment: 2/10    Any medication changes, allergies to medications, adverse drug reactions, diagnosis change, or new procedure performed?: [x] No    [] Yes (see summary sheet for update)  Subjective functional status/changes:   [] No changes reported  Stated she is feeling better pain is subsiding 1-2/10 but she had to start taking her her pain med's (once a day). Patient stated that due to the move she might her did some twist since she still experience some back pain, despite not lifting any heavy objects. OBJECTIVE      30 min Therapeutic Exercise:  [x] See flow sheet :   Rationale: increase ROM, increase strength and improve coordination to improve the patients ability to move lumbar area without pain/discomofrt and reduce stiffness. 10 min Manual Therapy: Right lumbar area    Rationale: decrease pain, increase ROM, increase tissue extensibility and decrease trigger points to improve the patients ability to move back in pain free range.              With   [x] TE   [] TA   [] neuro   [] other: Patient Education: [] Review HEP    [] Progressed/Changed HEP based on:   [] positioning   [] body mechanics   [] transfers   [] heat/ice application    [] other:      Other Objective/Functional Measures:    Re-assessment     Physical Findings   Ortho:   Posture:  In standing R iliac crest is higher than the L , mild-mod lordisis   Gait and Functional Mobility: mild left lateral lean, slow ajay  Palpation: mild R Gluteal area and one minor spot on L4 LS paraspinals   Gross findings:  Morbid obesity  Spine:   TRUNK ROM:      Flexion:                                  []??? N/A  [x]? ?? WNL  []? ? ? Minimal  []? ? ? Moderate  []??? Major able to touch toes   Extension:                             []??? N/A  []? ?? WNL  [x]? ? ? Minimal  []? ? ? Moderate  []??? Major on right side    Right Lateral Flexion:           []??? N/A  [x]? ?? WNL  []? ? ? Minimal  []? ? ? Moderate  []??? Major    Left Lateral Flexion:              []??? N/A  []? ?? WNL  [x]? ? ? Minimal  [x]? ? ? Moderate  []??? Major on right side pull   Rotation to the Right:           []??? N/A  [x]? ?? WNL  []? ? ? Minimal  []? ? ? Moderate  []??? Major   Rotation to the Left:              []??? N/A  [x]? ?? WNL  []? ? ? Minimal  []? ? ? Moderate  []??? Major   Right Side Glide:                   []??? N/A  []? ?? WNL  []? ? ? Minimal  []? ? ? Moderate  []??? Major  Left Side Glide:                     []??? N/A  []? ?? WNL  []? ? ? Minimal  []? ? ? Moderate  []??? Major  Additional comments:  L rotation and extension are  more painful in the R lumbar area.     Specific joints: *normal values in ()     Hip                                AROM                            PROM                              MMT    R L R L R L   Flexion (120)         4+ 4+   Extension (15)          4-  4-   Abduction (40)         4+  5   Adduction (30)          2  2-   IR (40)               ER (40)               Additional comments: Patient still has weakness and difficulty bridging but has no pain, she is able to scoot on the plinth and get into prone without out much back discomfort    Passive SLR not painful bilateral       Knee                                 AROM                          PROM                           MMT    R L R L R L   Extension (0)          5 5   Flexion (145) prone           4+  4+   Additional comments:       Ankle                                 AROM                       PROM                             MMT    R L R L R L   Dorsiflexion (15)         5 5   Plantarflexion (50)               Additional comments:               Mobility Assessment: Independent with decreased mild difficulty trying to turn on narrow plinth.       Pain Level (0-10 scale) post treatment: 0/10 post rock tool       ASSESSMENT/Changes in Function:   Patient continues to progress with core stabilization exercises she has been progressing from supine to standing activities, during today reassessment we noted improved in trunk /lumbar ROM, she is able to touch her toes and reports only mild discomfort with extension and pulling sensation in back with left side lean. Patient also improved in her strength in BLE's, overall despite progress hip extension and hip adduction still has room for improvement. Her endurance is also improving and she does not require rest brakes between activities and she present with mild tender to palpation spot on right lower pain however its hindering  much for her movement but she still feel it. Patient  Requested to extend few more sessions for her back since she believes she may have trigger her back pain post her moved, she started taking her pain meds and her goal is to subside her medication and have less pain with movement. Patient will continue to benefit from skilled PT services to modify and progress therapeutic interventions, address functional mobility deficits, address ROM deficits, address strength deficits, analyze and address soft tissue restrictions, analyze and cue movement patterns, analyze and modify body mechanics/ergonomics and assess and modify postural abnormalities to attain remaining goals. [x]  See Plan of Care  []  See progress note/recertification  []  See Discharge Summary         GOALS/Progress towards goals:    Short Term Goals:  To be accomplished in 6 treatments:              1. Pt will be independent with HEP [x]? Met  []? Not Met []? Partially Met              2.  Decrease pain by at least 2 points on the VAS [x]? Met  []? Not Met []? Partially Met              3. Pt will demonstrate improved lumbar ROM in FF with decreased pain standing upright, Rotation and lateral flexion to minimal limitation [x]? Met  []? Not Met []? Partially Met    1/12 new goals               Long Term Goals: To be accomplished in 5-8 treatments:              1.  Restore Lumbar ROM to no more than minimal limitations without pain []? Met  []? Not Met [x]? Partially Met              2. Pt will be able to perform a SLR to at least 60 degrees without pain. [x]? Met  []? Not Met []? Partially Met              3. Pt will report improvement in ability to do IADLs without pain </=2/10 [x]? Met  []? Not Met[]? Partially Met   4. Pt will be reduce her lower back pain medication as pain subsides [] Met [] Not met [] Partially met    5. Pt will report decreased tigger point and rightness on right lumbar area [] Met [] Not met [] Partially met    6.  Pt will be able to demonstrate good techniques to stretch lumbar area to reduce pain level [] Met [] Not met [] Partially met      PLAN  [x]  Upgrade activities as tolerated     []  Continue plan of care  []  Update interventions per flow sheet       []  Discharge due to:_  [x]  Other:_  Extend POC    Laura Whittington, PT 1/12/2021

## 2021-01-12 NOTE — PROGRESS NOTES
[address] [address] [facility] [address] [facility], [facility]  Ph: [phone]    Fax: [phone]  Plan of Care  Name: Brian Mendez   : 1960   MD: MELY Torrez     Treatment Diagnosis: Low back pain [M54.5]  Start of Care: 2021 Visits from Start of Care: 1  Missed Visits: 0  Problem List/Impairments: pain affecting function, decrease ROM, decrease strength, decrease ADL/ functional abilitiies, decrease activity tolerance and decrease flexibility/ joint mobility     Summary of Care/Goals:  Patient was progressing well with exercises as ready to be D/C however she had a minor set back following her recent move in [place], patient stated she tried to avoid lifting any heavy objects however she might her moved wrong since stated started to experience back pain. The pain is not has bad like it used to be when she started therapy however she reports some lumbar discomfort with certain movements. A reassessment was performed; we overall noted improved in trunk /lumbar ROM, she is able to touch her toes with trunk flexion and reports only mild discomfort with extension and pulling sensation in back with left side lean. Patient also improved in her strength in BLE's, but despite the progress hip extension and hip adduction still have room for improvement. Her endurance is also improving and she does not require rest brakes between activities, however she  present with mild tender to palpation spot on her right lower lumbar area around L4 with mild muscle spasms, it does not hindering  much for her movement but she still feel it with certain activities. Patient requested to continue in therapy, since before the move she was almost at her baseline. She started taking back her pain meds and her goal is to subside her medication and have less pain with movement.   Patient will continue to benefit from skilled PT services to modify and progress therapeutic interventions, address functional mobility deficits, address ROM deficits, address strength deficits, analyze and address soft tissue restrictions, analyze and cue movement patterns, analyze and modify body mechanics/ergonomics and assess and modify postural abnormalities to attain remaining goals. Short Term Goals: To be accomplished in 6 treatments:              1. Pt will be independent with HEP [x]? ? Met  []? ? Not Met []? ? Partially Met              2.  Decrease pain by at least 2 points on the VAS [x]? ? Met  []? ? Not Met []? ? Partially Met              3. Pt will demonstrate improved lumbar ROM in FF with decreased pain standing upright, Rotation and lateral flexion to minimal limitation [x]? ? Met  []? ? Not Met []? ? Partially Met     1/12/21 new goals               Long Term Goals: To be accomplished in 5-8 treatments:              1.  Restore Lumbar ROM to no more than minimal limitations without pain []? ? Met  []? ? Not Met [x]? ? Partially Met              2. Pt will be able to perform a SLR to at least 60 degrees without pain. [x]? ? Met  []? ? Not Met []? ? Partially Met              3. Pt will report improvement in ability to do IADLs without pain </=2/10 [x]? ? Met  []? ? Not Met[]? ? Partially Met              4. Pt will be reduce her lower back pain medication as pain subsides []? Met []? Not met []? Partially met               5. Pt will report decreased tigger point and rightness on right lumbar area []? Met []? Not met []? Partially met               6. Pt will be able to demonstrate good techniques to stretch lumbar area to reduce pain level []? Met []? Not met []? Partially met       Recommendations: Continuation of therapy   Frequency / Duration: Patient to be seen 1-2  times per week for 5-8  treatments.   Certification Period: 1/12/2021- 3/30/2021  Treatment Plan may include any combination of the following: Therapeutic exercise, Neuromuscular re-education, Physical agent/modality, Manual therapy and Patient education    Patient/ Caregiver education and instruction: exercises  [x]  Plan of care has been reviewed with PTA. Laura Whittington, PT 1/12/2021     Retain this original for your records. If you are unable to process this request in 24 hours, please contact our office.   ________________________________________________________________________  NOTE TO PHYSICIAN:  Please complete the following and fax to: [facility]:  Fax: [phone]   ____ I certify that the above Therapy Services are being furnished while the patient is under my care. I agree with the treatment plan and certify that this therapy is necessary.    ____ I have read the above report and request that my patient continue therapy with the following changes/special instructions:  ____ I have read the above report and request that my patient be discharged from therapy    Physician's Signature:_________________ Date:___________Time:__________

## 2021-01-14 ENCOUNTER — HOSPITAL ENCOUNTER (OUTPATIENT)
Dept: PHYSICAL THERAPY | Age: 61
Discharge: HOME | End: 2021-01-14
Payer: COMMERCIAL

## 2021-01-14 PROCEDURE — 97110 THERAPEUTIC EXERCISES: CPT

## 2021-01-14 NOTE — PROGRESS NOTES
PT DAILY TREATMENT NOTE - MCR     Patient Name: Apolonia Jon  Date:2021  : 1960  [x]  Patient  Verified  Payor: BLUE CROSS MEDICARE / Plan: [address] [facility] HMO / Product Type: Managed Care Medicare /    Treatment Area: Low back pain [M54.5]   Next MD APPT:   In time: 10:00 am  Out time: 1045am  Total Treatment Time (min):45 min  Total Timed Codes (min): 45min  1:1 Treatment Time ( [address] only): 45min  Visit #: 3/15 Visit count could not be calculated. Make sure you are using a visit which is associated with an episode. SUBJECTIVE  Pain Level (0-10 scale) pre treatment: 2/10    Any medication changes, allergies to medications, adverse drug reactions, diagnosis change, or new procedure performed?: [x] No    [] Yes (see summary sheet for update)  Subjective functional status/changes:   [] No changes reported  Stated she is feeling better. She has a new prescription for her cervical spine. OBJECTIVE      45 min Therapeutic Exercise:  [x] See flow sheet :   Rationale: increase ROM, increase strength and improve coordination to improve the patients ability to move lumbar area without pain/discomofrt and reduce stiffness. min Manual Therapy: Right lumbar area    Rationale: decrease pain, increase ROM, increase tissue extensibility and decrease trigger points to improve the patients ability to move back in pain free range. With   [x] TE   [] TA   [] neuro   [] other: Patient Education: [] Review HEP    [] Progressed/Changed HEP based on:   [] positioning   [] body mechanics   [] transfers   [] heat/ice application    [] other:      Other Objective/Functional Measures:      Pain Level (0-10 scale) post treatment: 0/10     ASSESSMENT/Changes in Function:   Patient was able to tolerate new exercises today without any difficulty. Patient has a new script for a new body part (cetvical spine ). Patient did well with all exercises and min cues.  Patient will continue to benefit from skilled PT services to modify and progress therapeutic interventions, address functional mobility deficits, address ROM deficits, address strength deficits, analyze and address soft tissue restrictions, analyze and cue movement patterns, analyze and modify body mechanics/ergonomics and assess and modify postural abnormalities to attain remaining goals. [x]  See Plan of Care  []  See progress note/recertification  []  See Discharge Summary         GOALS/Progress towards goals:    Short Term Goals: To be accomplished in 6 treatments:              1. Pt will be independent with HEP [x]? Met  []? Not Met []? Partially Met              2.  Decrease pain by at least 2 points on the VAS [x]? Met  []? Not Met []? Partially Met              3. Pt will demonstrate improved lumbar ROM in FF with decreased pain standing upright, Rotation and lateral flexion to minimal limitation [x]? Met  []? Not Met []? Partially Met    1/12 new goals               Long Term Goals: To be accomplished in 5-8 treatments:              1.  Restore Lumbar ROM to no more than minimal limitations without pain []? Met  []? Not Met [x]? Partially Met              2. Pt will be able to perform a SLR to at least 60 degrees without pain. [x]? Met  []? Not Met []? Partially Met              3. Pt will report improvement in ability to do IADLs without pain </=2/10 [x]? Met  []? Not Met[]? Partially Met   4. Pt will be reduce her lower back pain medication as pain subsides [] Met [] Not met [] Partially met    5. Pt will report decreased tigger point and rightness on right lumbar area [] Met [] Not met [] Partially met    6.  Pt will be able to demonstrate good techniques to stretch lumbar area to reduce pain level [] Met [] Not met [] Partially met      PLAN  [x]  Upgrade activities as tolerated     []  Continue plan of care  []  Update interventions per flow sheet       []  Discharge due to:_  [x]  Other:_  Extend ALEXANDER Luna Aditya 1/14/2021

## 2021-01-19 ENCOUNTER — HOSPITAL ENCOUNTER (OUTPATIENT)
Dept: PHYSICAL THERAPY | Age: 61
Discharge: HOME | End: 2021-01-19
Payer: COMMERCIAL

## 2021-01-19 PROCEDURE — 97110 THERAPEUTIC EXERCISES: CPT

## 2021-01-19 NOTE — PROGRESS NOTES
PT DAILY TREATMENT NOTE - MCR     Patient Name: Viviana Model  Date:2021  : 1960  [x]  Patient  Verified  Payor: Eri Saab / Plan: [address] [facility] HMO / Product Type: Managed Care Medicare /    Treatment Area: Low back pain [M54.5]   Next MD APPT:   In time: 1:51 pm  Out time: 2:30 pm  Total Treatment Time (min):45 min  Total Timed Codes (min): 45min  1:1 Treatment Time ( [address] only): 45min  Visit #: 4/15 Visit count could not be calculated. Make sure you are using a visit which is associated with an episode. SUBJECTIVE  Pain Level (0-10 scale) pre treatment: 0/10    Any medication changes, allergies to medications, adverse drug reactions, diagnosis change, or new procedure performed?: [x] No    [] Yes (see summary sheet for update)  Subjective functional status/changes:   [] No changes reported  Patient reports no pain at this time it all dependents on what she is doing. She still take one pain pill a day for her back. OBJECTIVE    40 min Therapeutic Exercise:  [x] See flow sheet :   Rationale: increase ROM, increase strength and improve coordination to improve the patients ability to move lumbar area without pain/discomofrt and reduce stiffness. 5 min Manual Therapy: Right lumbar area    Rationale: decrease pain, increase ROM, increase tissue extensibility and decrease trigger points to improve the patients ability to move back in pain free range. With   [x] TE   [] TA   [] neuro   [] other: Patient Education: [] Review HEP    [] Progressed/Changed HEP based on:   [] positioning   [] body mechanics   [] transfers   [] heat/ice application    [] other:      Other Objective/Functional Measures:    Continue with plan of care. Mild trigger points noted on right lumbar side    Pain Level (0-10 scale) post treatment: 0/10     ASSESSMENT/Changes in Function:   Continue with POC no discomfort with activities nor back pain, reported.  Patient has one more session for lower back prior to D/C. Performed rock tool on lower back, trigger points are much minimal and not as painful. Patient has a new script for a new body part (cervial spine ). Patient did well with all exercises and min cues. Patient will continue to benefit from skilled PT services to modify and progress therapeutic interventions, address functional mobility deficits, address ROM deficits, address strength deficits, analyze and address soft tissue restrictions, analyze and cue movement patterns, analyze and modify body mechanics/ergonomics and assess and modify postural abnormalities to attain remaining goals. [x]  See Plan of Care  []  See progress note/recertification  []  See Discharge Summary         GOALS/Progress towards goals:    Short Term Goals: To be accomplished in 6 treatments:              1. Pt will be independent with HEP [x]? Met  []? Not Met []? Partially Met              2.  Decrease pain by at least 2 points on the VAS [x]? Met  []? Not Met []? Partially Met              3. Pt will demonstrate improved lumbar ROM in FF with decreased pain standing upright, Rotation and lateral flexion to minimal limitation [x]? Met  []? Not Met []? Partially Met    1/12 new goals               Long Term Goals: To be accomplished in 5-8 treatments:              1.  Restore Lumbar ROM to no more than minimal limitations without pain []? Met  []? Not Met [x]? Partially Met              2. Pt will be able to perform a SLR to at least 60 degrees without pain. [x]? Met  []? Not Met []? Partially Met              3. Pt will report improvement in ability to do IADLs without pain </=2/10 [x]? Met  []? Not Met[]? Partially Met   4. Pt will be reduce her lower back pain medication as pain subsides [] Met [] Not met [] Partially met    5. Pt will report decreased tigger point and rightness on right lumbar area [] Met [] Not met [] Partially met    6.  Pt will be able to demonstrate good techniques to stretch lumbar area to reduce pain level [] Met [] Not met [] Partially met      PLAN  [x]  Upgrade activities as tolerated     []  Continue plan of care  []  Update interventions per flow sheet       []  Discharge due to:_  [x]  Other:_  Extend POC    Laura Whittington, PT 1/19/2021

## 2021-01-21 ENCOUNTER — HOSPITAL ENCOUNTER (OUTPATIENT)
Dept: PHYSICAL THERAPY | Age: 61
Discharge: HOME | End: 2021-01-21
Payer: COMMERCIAL

## 2021-01-21 PROCEDURE — 97110 THERAPEUTIC EXERCISES: CPT

## 2021-01-21 PROCEDURE — 97162 PT EVAL MOD COMPLEX 30 MIN: CPT
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
## 2021-01-25 ENCOUNTER — HOSPITAL ENCOUNTER (OUTPATIENT)
Dept: PHYSICAL THERAPY | Age: 61
Discharge: HOME | End: 2021-01-25
Payer: COMMERCIAL

## 2021-01-25 PROCEDURE — 97110 THERAPEUTIC EXERCISES: CPT

## 2021-01-25 NOTE — PROGRESS NOTES
PT DAILY TREATMENT NOTE - [facility]     Patient Name: Alfredo De La Cruz  Date:2021  : 1960  [x]  Patient  Verified  Payor: BLUE CROSS MEDICARE / Plan: [address] [facility] HMO / Product Type: Managed Care Medicare /    Treatment Area: Low back pain [M54.5]  Cervicalgia [M54.2]   Next MD APPT:   In time: 1:02 pm  Out time: 2:45pm  Total Treatment Time (min):43 min  Total Timed Codes (min): 30min  1:1 Treatment Time ( [address] only): 43min  Visit #: 4/15 Visit count could not be calculated. Make sure you are using a visit which is associated with an episode. SUBJECTIVE  Pain Level (0-10 scale) pre treatment: 0/10    Any medication changes, allergies to medications, adverse drug reactions, diagnosis change, or new procedure performed?: [x] No    [] Yes (see summary sheet for update)  Subjective functional status/changes:   [] No changes reported  Patient no back pain at this time. She feels well occasional stiffness but nothing to complain about. OBJECTIVE    30 min Therapeutic Exercise:  [x] See flow sheet :   Rationale: increase ROM, increase strength and improve coordination to improve the patients ability to move lumbar area without pain/discomofrt and reduce stiffness. min Manual Therapy: Right lumbar area    Rationale: decrease pain, increase ROM, increase tissue extensibility and decrease trigger points to improve the patients ability to move back in pain free range.              With   [x] TE   [] TA   [] neuro   [] other: Patient Education: [] Review HEP    [] Progressed/Changed HEP based on:   [] positioning   [] body mechanics   [] transfers   [] heat/ice application    [] other:        Other Objective/Functional Measures:    Re-assessment      Physical Findings   Ortho:   Posture:  In standing R iliac crest is higher than the L , mild-mod lordisis   Gait and Functional Mobility: mild left lateral lean, slow ajay  Palpation: mild R Gluteal area and one minor spot on L4 LS paraspinals   Gross findings:  Morbid obesity  Spine:   TRUNK ROM:      Flexion:                                  []???? N/A  [x]? ??? WNL  []?? ??Minimal  []????Moderate  []???? Major able to touch toes   Extension:                             []???? N/A  []????WNL  [x]? ???Minimal  []????Moderate  []???? Major on right side    Right Lateral Flexion:           []???? N/A  [x]? ??? WNL  []?? ??Minimal  []????Moderate  []???? Major    Left Lateral Flexion:              []???? N/A  [x]? ??? WNL  []?? ??Minimal  []????Moderate  []???? Major   Rotation to the Right:           []???? N/A  [x]? ??? WNL  []?? ??Minimal  []????Moderate  []???? Major   Rotation to the Left:              []???? N/A  [x]? ??? WNL  []?? ??Minimal  []????Moderate  []???? Major   Right Side Glide:                   []???? N/A  []????WNL  []?? ??Minimal  []????Moderate  []???? Major  Left Side Glide:                     []???? N/A  []????WNL  []?? ??Minimal  []????Moderate  []???? Major  Additional comments:  L rotation and extension are  more painful in the R lumbar area.     Specific joints: *normal values in ()     Hip                                AROM                            PROM                              MMT    R L R L R L   Flexion (120)         5 5   Extension (15)          4-  4-   Abduction (40)         5  5   Adduction (30)         2+  3+   IR (40)               ER (40)               Additional comments:able   Passive SLR not painful bilateral       Knee                                 AROM                          PROM                           MMT    R L R L R L   Extension (0)          5 5   Flexion (145) prone           4+  4+   Additional comments:        Ankle                                 AROM                       PROM                             MMT    R L R L R L   Dorsiflexion (15)         5 5   Plantarflexion (50)               Additional comments:                Mobility Assessment: Independent with decreased mild difficulty trying to turn on narrow plinth.        SLS= L = 8 sec  / R = 9 sec   Tandem L post =1 min  R Post = 1 min      Pain Level (0-10 scale) post treatment: 0/10     ASSESSMENT/Changes in Function:   Patient has been reporting no pain for the last few session, stated she feels better, she reduced her pain meds and her lower back does not interfere much with her daily activities. She is doing her exercises and stretches, since she recently move to a different apartment, she is more aware of how to lift things and aware of her back movement, she occasional avoid too much movement, but knows she needs to move and stay activities to get better. Patient has met most of her goals and she progressed with her LE/core strength, lumbar AROM, flexibility, balance and overall mobility. Patient was D/C with a home exercises program with thera band and was encouraged to keep exercising. Patient will be returning to therapy to work on her neck pain. []  See Plan of Care  []  See progress note/recertification  [x]  See Discharge Summary         GOALS/Progress towards goals:    Short Term Goals: To be accomplished in 6 treatments:              1. Pt will be independent with HEP [x]? Met  []? Not Met []? Partially Met              2.  Decrease pain by at least 2 points on the VAS [x]? Met  []? Not Met []? Partially Met              3. Pt will demonstrate improved lumbar ROM in FF with decreased pain standing upright, Rotation and lateral flexion to minimal limitation [x]? Met  []? Not Met []? Partially Met    1/12 new goals               Long Term Goals: To be accomplished in 5-8 treatments:              1.  Restore Lumbar ROM to no more than minimal limitations without pain [x]? Met  []? Not Met []? Partially Met              2. Pt will be able to perform a SLR to at least 60 degrees without pain. [x]? Met  []? Not Met []? Partially Met              3. Pt will report improvement in ability to do IADLs without pain </=2/10 [x]?  Met []? Not Met[]? Partially Met   4. Pt will be reduce her lower back pain medication as pain subsides [x] Met [] Not met [] Partially met    5. Pt will report decreased tigger point and rightness on right lumbar area [x] Met [] Not met [] Partially met    6.  Pt will be able to demonstrate good techniques to stretch lumbar area to reduce pain level [x] Met [] Not met [] Partially met      PLAN  [x]  Upgrade activities as tolerated     []  Continue plan of care  []  Update interventions per flow sheet       []  Discharge due to:_  [x]  Other:_  Extend POC    Laura Whittington, PT 1/25/2021

## 2021-01-25 NOTE — ANCILLARY DISCHARGE INSTRUCTIONS
[address] [address] [facility] [address] [facility] [address] [place] [address]  Ph: [phone]  Fax: [phone]    Discharge Summary 2-15    Patient name: Lino Collins  : 1960  Provider#: [id number]  Referral source: MELY Rodriguez      Medical/Treatment Diagnosis: Low back pain [M54.5]  Cervicalgia [M54.2]     Prior Hospitalization: see medical history     Comorbidities: See Plan of Care  Prior Level of Function: See Plan of Care  Medications: Verified on Patient Summary List  Start of Care: 2020  Onset Date:9/15/2020  Visits from Start of Care: 17  Missed Visits:0  Reporting Period : 2020  to 2021    Assessment/Summary of care/GOALS:   Patient has been reporting no pain for the last few sessions, stated she feels better, she reduced her pain meds and her lower back does not interfere much with her daily activities. She is doing her exercises and stretches, since she recently move to a different apartment, she is more aware of how to lift things and aware of her back movement, she occasional avoids too much movement, but knows she needs to move and stay activities to get better. Patient has met most of her goals and she progressed with her LE/core strength, lumbar AROM, flexibility, balance and overall mobility. Patient was D/C with a home exercise program with thera band and was encouraged to keep exercising. Patient will be returning to therapy to work on her neck pain. Other Objective/Functional Measures:    Re-assessment      Physical Findings   Ortho:   Posture:  In standing R iliac crest is higher than the L , mild-mod lordisis   Gait and Functional Mobility: mild left lateral lean, slow ajay  Palpation: mild R Gluteal area and one minor spot on L4 LS paraspinals   Gross findings:  Morbid obesity  Spine:   TRUNK ROM:      Flexion:                                  []????? N/A  [x]? ?? ?? WNL  []??? ? ? Minimal  []??? ? ?Moderate  []????? Major able to touch toes   Extension:                             []????? N/A  []????? WNL  [x]? ?? ? ? Minimal  []??? ? ?Moderate  []????? Major on right side    Right Lateral Flexion:           []????? N/A  [x]? ?? ?? WNL  []??? ? ? Minimal  []??? ? ?Moderate  []????? Major    Left Lateral Flexion:              []????? N/A  [x]? ?? ?? WNL  []??? ? ? Minimal  []??? ? ?Moderate  []????? Major   Rotation to the Right:           []????? N/A  [x]? ?? ?? WNL  []??? ? ? Minimal  []??? ? ?Moderate  []????? Major   Rotation to the Left:              []????? N/A  [x]? ?? ?? WNL  []??? ? ? Minimal  []??? ? ?Moderate  []????? Major   Right Side Glide:                   []????? N/A  []????? WNL  []??? ? ? Minimal  []??? ? ?Moderate  []????? Major  Left Side Glide:                     []????? N/A  []????? WNL  []??? ? ? Minimal  []??? ? ?Moderate  []????? Major  Additional comments:  L rotation and extension are  more painful in the R lumbar area.     Specific joints: *normal values in ()     Hip                                AROM                            PROM                              MMT    R L R L R L   Flexion (120)         5 5   Extension (15)          4-  4-   Abduction (40)         5  5   Adduction (30)         2+  3+   IR (40)               ER (40)               Additional comments:able   Passive SLR not painful bilateral       Knee                                 AROM                          PROM                           MMT    R L R L R L   Extension (0)          5 5   Flexion (145) prone           4+  4+   Additional comments:        Ankle                                 AROM                       PROM                             MMT    R L R L R L   Dorsiflexion (15)         5 5   Plantarflexion (50)               Additional comments:                Mobility Assessment: Independent with decreased mild difficulty trying to turn on narrow plinth.         SLS= L = 8 sec  / R = 9 sec   Tandem L post =1 min  R Post = 1 min         RECOMMENDATIONS:  [x]Discontinue therapy: [x]Patient has reached or is progressing toward set goals     []Patient is non-compliant or has abdicated     []Due to lack of appreciable progress towards set goals     []Other  Laura Whittington, PT 1/25/2021

## 2021-01-27 ENCOUNTER — HOSPITAL ENCOUNTER (OUTPATIENT)
Dept: PHYSICAL THERAPY | Age: 61
Discharge: HOME | End: 2021-01-27
Payer: COMMERCIAL

## 2021-01-27 PROCEDURE — 97014 ELECTRIC STIMULATION THERAPY: CPT

## 2021-01-27 PROCEDURE — 97140 MANUAL THERAPY 1/> REGIONS: CPT

## 2021-01-27 PROCEDURE — 97110 THERAPEUTIC EXERCISES: CPT

## 2021-01-27 NOTE — PROGRESS NOTES
PT DAILY TREATMENT NOTE - [facility]     Patient Name: Agueda Roper  Date:2021  : 1960  [x]  Patient  Verified  Payor: BLUE CROSS MEDICARE / Plan: [facility] HMO / Product Type: Managed Care Medicare /    Treatment Area: Low back pain [M54.5]  Cervicalgia [M54.2]   Next MD APPT: ? In time: 2:15pm  Out time:3:00pm  Total Treatment Time (min): 45  Total Timed Codes (min): 45  1:1 Treatment Time ( [address] only): 45    Visit #:   Visit count could not be calculated. Make sure you are using a visit which is associated with an episode. SUBJECTIVE  Pain Level (0-10 scale) pre treatment: 6/10 with head turns Pain Level (0-10 scale) post treatment: 4-5/10  Any medication changes, allergies to medications, adverse drug reactions, diagnosis change, or new procedure performed?: [x] No    [] Yes (see summary sheet for update)  Subjective functional status/changes:   [] No changes reported  Patient reports neck pain with head turns towards L>R side.  Stated she is doing the exercises     OBJECTIVE    Modality rationale: decrease edema, decrease inflammation, decrease pain and increase tissue extensibility to improve the patients ability to turn her head with ADLs   Min Type Additional Details   20 [x] Estim: [x]UnAtt   []Att       []TENS instruct                  [x]IFC  []Premod   []NMES                     []Other:  []w/US   []w/ice   [x]w/heat  Position:sitting   Location: L shoulder     []  Ice     []  Heat  []  Ice massage Position:  Location:    []  Traction: [] Cervical       []Lumbar                       [] Prone          []Supine                       []Intermittent   []Continuous Lbs:  []w/heat  []W/heat and Estim    []  Ultrasound: []Continuous   [] Pulsed at:                           []1MHz   []3MHz Location:  W/cm2:      [x] Skin assessment post-treatment:   [x]intact  []redness- no adverse reaction   []redness - adverse reaction:   25 min Therapeutic Exercise:  [x] See flow sheet :   Rationale: increase ROM, increase strength and improve coordination to improve the patients ability to turn neck without pain and discomfort during her ADLs    20 min Manual Therapy: L UT supraspinatus    Rationale: decrease pain, increase ROM, increase tissue extensibility and decrease trigger points to improve the patients ability to move neck with less discomfort      With   [x] TE   [] TA   [] Neuro   [] SC   [] other: Patient Education: [x] Review HEP    [] Progressed/Changed HEP based on:   [] positioning   [] body mechanics   [] transfers   [] Use of heat/ice    [] other:          Other Objective/Functional Measures:   Exercises program was initiated   Mild pain with shoulder shrugs   Cues to decreased shoulder hike with exercises provided     ASSESSMENT/Changes in Function:   Patient tolerated activities farily well, reports discomfort with shoulder shrugs on L side and has limited range with SB/ROT towards L>R side. Patient also guarding with active movement and stretches, cues to relax provided as well as to decreased shoulder hiking compensation. During MT noted increased trigger points in left UT patient tolerated tool rock and soft tissue mobilization. Patient will continue to benefit from skilled PT services to modify and progress therapeutic interventions, address functional mobility deficits, address ROM deficits, address strength deficits, analyze and address soft tissue restrictions, analyze and cue movement patterns and assess and modify postural abnormalities to attain remaining goals. []  See Plan of Care  []  See progress note/recertification  []  See Discharge Summary         GOALS/Progress towards goals:  Short Term Goals: To be accomplished in 2-4  treatments:  1. Patient will be independent with HEP to progress with POC [] Met [] Not met [] Partially met   2. Patient pain level with decreased by 2 points on the VAS pain scale </=4/10 [] Met [] Not met [] Partially met   3. Patient will improve neck SB/ROT ROM by 5-10 deg to improve neck mobility [] Met [] Not met [] Partially met      Long Term Goals: To be accomplished in 8  treatments:  1. Patient will be able to turn her head while driving with less pain and difficulty [] Met [] Not met [] Partially met   2. Patient will report less neck pain with her ADL's and IADL's to improve her mobility [] Met [] Not met [] Partially met   3. Patient will reports less neck pain and discomfort while sleeping to improve her quality of life. [] Met [] Not met [] Partially met   4 Patient will report no headaches or less frequent headaches to improve her quality of life.  [] Met [] Not met [] Partially met        PLAN  []  Upgrade activities as tolerated     []  Continue plan of care  []  Update interventions per flow sheet       []  Discharge due to:_  []  Other:_      Telly Ragsdale, PT 1/27/2021

## 2021-02-01 ENCOUNTER — HOSPITAL ENCOUNTER (OUTPATIENT)
Dept: PHYSICAL THERAPY | Age: 61
Discharge: HOME | End: 2021-02-01
Payer: MEDICARE

## 2021-02-01 PROCEDURE — 97032 APPL MODALITY 1+ESTIM EA 15: CPT

## 2021-02-01 PROCEDURE — 97110 THERAPEUTIC EXERCISES: CPT

## 2021-02-01 PROCEDURE — 97140 MANUAL THERAPY 1/> REGIONS: CPT

## 2021-02-01 NOTE — PROGRESS NOTES
PT DAILY TREATMENT NOTE - [facility]     Patient Name: Renetta Hawkins  Date:2021  : 1960  [x]  Patient  Verified  Payor: BLUE CROSS MEDICARE / Plan: [facility] HMO / Product Type: Managed Care Medicare /    Treatment Area: Low back pain [M54.5]  Cervicalgia [M54.2]   Next MD APPT: ? In time: 1:00pm  Out time: 2:15 pm  Total Treatment Time (min): 50  Total Timed Codes (min): 75  1:1 Treatment Time ( [address] only): 75    Visit #: 3/8  Visit count could not be calculated. Make sure you are using a visit which is associated with an episode. SUBJECTIVE  Pain Level (0-10 scale) pre treatment: 5/10 with head turns only Pain Level (0-10 scale) post treatment: 4-5/10  Any medication changes, allergies to medications, adverse drug reactions, diagnosis change, or new procedure performed?: [x] No    [] Yes (see summary sheet for update)  Subjective functional status/changes:   [] No changes reported  Patient reports neck pain with head turns towards L>R side. Stated she is doing the exercises and she took her pain meds before therapy.      OBJECTIVE    Modality rationale: decrease edema, decrease inflammation, decrease pain and increase tissue extensibility to improve the patients ability to turn her head with ADLs   Min Type Additional Details   20 [x] Estim: [x]UnAtt   []Att       []TENS instruct                  [x]IFC  []Premod   []NMES                     []Other:  []w/US   []w/ice   [x]w/heat  Position:sitting   Location: L shoulder     []  Ice     []  Heat  []  Ice massage Position:  Location:    []  Traction: [] Cervical       []Lumbar                       [] Prone          []Supine                       []Intermittent   []Continuous Lbs:  []w/heat  []W/heat and Estim    []  Ultrasound: []Continuous   [] Pulsed at:                           []1MHz   []3MHz Location:  W/cm2:      [x] Skin assessment post-treatment:   [x]intact  []redness- no adverse reaction   []redness - adverse reaction:   30 min Therapeutic Exercise:  [x] See flow sheet :   Rationale: increase ROM, increase strength and improve coordination to improve the patients ability to turn neck without pain and discomfort during her ADLs    20 min Manual Therapy: L UT supraspinatus    Rationale: decrease pain, increase ROM, increase tissue extensibility and decrease trigger points to improve the patients ability to move neck with less discomfort      With   [x] TE   [] TA   [] Neuro   [] SC   [] other: Patient Education: [x] Review HEP    [] Progressed/Changed HEP based on:   [] positioning   [] body mechanics   [] transfers   [] Use of heat/ice    [] other:          Other Objective/Functional Measures:   Decreased cervical ROM with SB and rotation noted more limitation towards left side. ASSESSMENT/Changes in Function:   Patient continues to progress well with exercises, continues to reports pain with neck rotation and SB, patient encouraged to move the neck as tolerated and avoid guarding too much. Patient presents with increased trigger points from C3-/c4 towards left >right upper trap, educated on cervical ROM and stretches. HEP was provided to patient with towel cervical rotation. Patient will continue to benefit from skilled PT services to modify and progress therapeutic interventions, address functional mobility deficits, address ROM deficits, address strength deficits, analyze and address soft tissue restrictions, analyze and cue movement patterns and assess and modify postural abnormalities to attain remaining goals. []  See Plan of Care  []  See progress note/recertification  []  See Discharge Summary         GOALS/Progress towards goals:  Short Term Goals: To be accomplished in 2-4  treatments:  1. Patient will be independent with HEP to progress with POC [] Met [] Not met [] Partially met   2. Patient pain level with decreased by 2 points on the VAS pain scale </=4/10 [] Met [] Not met [] Partially met   3. Patient will improve neck SB/ROT ROM by 5-10 deg to improve neck mobility [] Met [] Not met [] Partially met      Long Term Goals: To be accomplished in 8  treatments:  1. Patient will be able to turn her head while driving with less pain and difficulty [] Met [] Not met [] Partially met   2. Patient will report less neck pain with her ADL's and IADL's to improve her mobility [] Met [] Not met [] Partially met   3. Patient will reports less neck pain and discomfort while sleeping to improve her quality of life. [] Met [] Not met [] Partially met   4 Patient will report no headaches or less frequent headaches to improve her quality of life.  [] Met [] Not met [] Partially met        PLAN  []  Upgrade activities as tolerated     []  Continue plan of care  []  Update interventions per flow sheet       []  Discharge due to:_  []  Other:_      Nori Bill, PT 2/1/2021

## 2021-02-03 ENCOUNTER — HOSPITAL ENCOUNTER (OUTPATIENT)
Dept: PHYSICAL THERAPY | Age: 61
Discharge: HOME | End: 2021-02-03
Payer: MEDICARE

## 2021-02-03 PROCEDURE — 97110 THERAPEUTIC EXERCISES: CPT

## 2021-02-03 PROCEDURE — 97032 APPL MODALITY 1+ESTIM EA 15: CPT

## 2021-02-03 PROCEDURE — 97140 MANUAL THERAPY 1/> REGIONS: CPT

## 2021-02-03 NOTE — PROGRESS NOTES
PT DAILY TREATMENT NOTE - [facility]     Patient Name: Coid Gunn  TWEX:[id number]  : 1960  [x]  Patient  Verified  Payor: BLUE CROSS MEDICARE / Plan: [facility]  HMO / Product Type: Managed Care Medicare /    Treatment Area: Low back pain [M54.5]  Cervicalgia [M54.2]   Next MD APPT: ? In time: 1:02pm  Out time: 2:08  pm  Total Treatment Time (min): 65  Total Timed Codes (min): 45  1:1 Treatment Time ([facility] only): 45    Visit #:   Visit count could not be calculated. Make sure you are using a visit which is associated with an episode. SUBJECTIVE  Pain Level (0-10 scale) pre treatment: 4/10 with head turns only Pain Level (0-10 scale) post treatment: 3/10  Any medication changes, allergies to medications, adverse drug reactions, diagnosis change, or new procedure performed?: [x] No    [] Yes (see summary sheet for update)  Subjective functional status/changes:   [] No changes reported  Patient stated she feels slight better, she was turning her head while driving and stated the pain was not as bad. She took one pain pill before therapy today.      OBJECTIVE    Modality rationale: decrease edema, decrease inflammation, decrease pain and increase tissue extensibility to improve the patients ability to turn her head with ADLs   Min Type Additional Details   20 [x] Estim: [x]UnAtt   []Att       []TENS instruct                  [x]IFC  []Premod   []NMES                     []Other:  []w/US   []w/ice   [x]w/heat  Position:supine with bolster  Location: B shoulder     []  Ice     []  Heat  []  Ice massage Position:  Location:    []  Traction: [] Cervical       []Lumbar                       [] Prone          []Supine                       []Intermittent   []Continuous Lbs:  []w/heat  []W/heat and Estim    []  Ultrasound: []Continuous   [] Pulsed at:                           []1MHz   []3MHz Location:  W/cm2:      [x] Skin assessment post-treatment:   [x]intact  []redness- no adverse reaction   []redness - adverse reaction:   30 min Therapeutic Exercise:  [x] See flow sheet :   Rationale: increase ROM, increase strength and improve coordination to improve the patients ability to turn neck without pain and discomfort during her ADLs    15 min Manual Therapy: B UT supraspinatus    Rationale: decrease pain, increase ROM, increase tissue extensibility and decrease trigger points to improve the patients ability to move neck with less discomfort      With   [x] TE   [] TA   [] Neuro   [] SC   [] other: Patient Education: [x] Review HEP    [] Progressed/Changed HEP based on:   [] positioning   [] body mechanics   [] transfers   [] Use of heat/ice    [] other:          Other Objective/Functional Measures:   Continue with POC - reps increased   Verbal cues to relax shoulder provided and avoid shoulder hiking with neck ROM exercises   Decreased cervical ROM with SB and rotation noted more limitation towards Rigth side. ASSESSMENT/Changes in Function:    Patient right side appeared more tight today then left with increased tigger points along upper trap and supraspinatus. We also noted decreased neck ROM on R>L, patient does not recall doing anything difference in the last 2 days, patient was encouraged to continue to neck ROM and stretches at home. Patient will continue to benefit from skilled PT services to modify and progress therapeutic interventions, address functional mobility deficits, address ROM deficits, address strength deficits, analyze and address soft tissue restrictions, analyze and cue movement patterns and assess and modify postural abnormalities to attain remaining goals. []  See Plan of Care  []  See progress note/recertification  []  See Discharge Summary         GOALS/Progress towards goals:  Short Term Goals: To be accomplished in 2-4  treatments:  1. Patient will be independent with HEP to progress with POC [] Met [] Not met [] Partially met   2.  Patient pain level with decreased by 2 points on the VAS pain scale </=4/10 [] Met [] Not met [] Partially met   3. Patient will improve neck SB/ROT ROM by 5-10 deg to improve neck mobility [] Met [] Not met [] Partially met      Long Term Goals: To be accomplished in 8  treatments:  1. Patient will be able to turn her head while driving with less pain and difficulty [] Met [] Not met [] Partially met   2. Patient will report less neck pain with her ADL's and IADL's to improve her mobility [] Met [] Not met [] Partially met   3. Patient will reports less neck pain and discomfort while sleeping to improve her quality of life. [] Met [] Not met [] Partially met   4 Patient will report no headaches or less frequent headaches to improve her quality of life.  [] Met [] Not met [] Partially met        PLAN  []  Upgrade activities as tolerated     []  Continue plan of care  []  Update interventions per flow sheet       []  Discharge due to:_  []  Other:_      Nick Johnson, PT 2/3/2021

## 2021-02-08 ENCOUNTER — HOSPITAL ENCOUNTER (OUTPATIENT)
Dept: PHYSICAL THERAPY | Age: 61
Discharge: HOME | End: 2021-02-08
Payer: MEDICARE

## 2021-02-08 PROCEDURE — 97110 THERAPEUTIC EXERCISES: CPT

## 2021-02-08 PROCEDURE — 97140 MANUAL THERAPY 1/> REGIONS: CPT

## 2021-02-08 NOTE — PROGRESS NOTES
PT DAILY TREATMENT NOTE - [facility]     Patient Name: Beatrice Sandoval  Date:2021  : 1960  [x]  Patient  Verified  Payor: BLUE CROSS MEDICARE / Plan: [facility]  HMO / Product Type: Managed Care Medicare /    Treatment Area: Low back pain [M54.5]  Cervicalgia [M54.2]   Next MD APPT: ? In time: 1:45pm  Out time: 2:33  pm  Total Treatment Time (min): 45  Total Timed Codes (min): 45  1:1 Treatment Time ([facility] only): 45    Visit #:   Visit count could not be calculated. Make sure you are using a visit which is associated with an episode. SUBJECTIVE  Pain Level (0-10 scale) pre treatment: 4/10 with head turns only  Pain Level (0-10 scale) post treatment: 2-3/10  Any medication changes, allergies to medications, adverse drug reactions, diagnosis change, or new procedure performed?: [x] No    [] Yes (see summary sheet for update)  Subjective functional status/changes:   [] No changes reported  Patient stated her pain depends on how she turns her head. She reports pain when trying to turn her neck while driving. \"If I go slowly, I'm ok. \" Patient reporting more pain on R UT area today. Patient declined modalities today post treatment. She states she stays tight all the time and tries to get her family to massage her neck.      OBJECTIVE    Modality rationale: decrease edema, decrease inflammation, decrease pain and increase tissue extensibility to improve the patients ability to turn her head with ADLs   Min Type Additional Details    [] Estim: []UnAtt   []Att       []TENS instruct                  []IFC  []Premod   []NMES                     []Other:  []w/US   []w/ice   []w/heat  Position:supine with bolster  Location: B shoulder     []  Ice     []  Heat  []  Ice massage Position:  Location:    []  Traction: [] Cervical       []Lumbar                       [] Prone          []Supine                       []Intermittent   []Continuous Lbs:  []w/heat  []W/heat and Estim    []  Ultrasound: []Continuous   [] Pulsed at:                           []1MHz   []3MHz Location:  W/cm2:      [] Skin assessment post-treatment:   [x]intact  []redness- no adverse reaction   []redness - adverse reaction:   30 min Therapeutic Exercise:  [x] See flow sheet :   Rationale: increase ROM, increase strength and improve coordination to improve the patients ability to turn neck without pain and discomfort during her ADLs    15 min Manual Therapy: B UT with rock tool    Rationale: decrease pain, increase ROM, increase tissue extensibility and decrease trigger points to improve the patients ability to move neck with less discomfort      With   [x] TE   [] TA   [] Neuro   [] SC   [] other: Patient Education: [x] Review HEP    [] Progressed/Changed HEP based on:   [] positioning   [] body mechanics   [] transfers   [] Use of heat/ice    [] other:          Other Objective/Functional Measures:   Continue with POC -   Taped R UT area today and instructed she can leave it on until next visit on 2/11/21 or remove it bothers her  Also added muscle energy to cervical spine in Rotation and Lateral flexion  ASSESSMENT/Changes in Function:    Patient was able to tolerate MT with rock tool better today. Increase trigger points noted R > L today applied [employer] tape to R UT with instructions to remove if it increases pain or causes irritation. Also performed muscle energy to cervical spine in rotation and lateral flexion. Increase in ROM noted with rotation. Patient became guarded with lateral flexion. Patient declined modalities today and reported decrease in pain. Patient will continue to benefit from skilled PT services to modify and progress therapeutic interventions, address functional mobility deficits, address ROM deficits, address strength deficits, analyze and address soft tissue restrictions, analyze and cue movement patterns and assess and modify postural abnormalities to attain remaining goals.      []  See Plan of Care  []  See progress note/recertification  []  See Discharge Summary         GOALS/Progress towards goals:  Short Term Goals: To be accomplished in 2-4  treatments:  1. Patient will be independent with HEP to progress with POC [] Met [] Not met [] Partially met   2. Patient pain level with decreased by 2 points on the VAS pain scale </=4/10 [] Met [] Not met [] Partially met   3. Patient will improve neck SB/ROT ROM by 5-10 deg to improve neck mobility [] Met [] Not met [] Partially met      Long Term Goals: To be accomplished in 8  treatments:  1. Patient will be able to turn her head while driving with less pain and difficulty [] Met [] Not met [] Partially met   2. Patient will report less neck pain with her ADL's and IADL's to improve her mobility [] Met [] Not met [] Partially met   3. Patient will reports less neck pain and discomfort while sleeping to improve her quality of life. [] Met [] Not met [] Partially met   4 Patient will report no headaches or less frequent headaches to improve her quality of life.  [] Met [] Not met [] Partially met        PLAN  []  Upgrade activities as tolerated     []  Continue plan of care  []  Update interventions per flow sheet       []  Discharge due to:_  []  Other:_      Suly Dobson 2/8/2021

## 2021-02-11 ENCOUNTER — HOSPITAL ENCOUNTER (OUTPATIENT)
Dept: PHYSICAL THERAPY | Age: 61
Discharge: HOME | End: 2021-02-11
Payer: MEDICARE

## 2021-02-11 PROCEDURE — 97110 THERAPEUTIC EXERCISES: CPT

## 2021-02-11 PROCEDURE — 97140 MANUAL THERAPY 1/> REGIONS: CPT

## 2021-02-17 ENCOUNTER — HOSPITAL ENCOUNTER (OUTPATIENT)
Dept: PHYSICAL THERAPY | Age: 61
Discharge: HOME | End: 2021-02-17
Payer: MEDICARE

## 2021-02-17 PROCEDURE — 97140 MANUAL THERAPY 1/> REGIONS: CPT

## 2021-02-17 PROCEDURE — 97110 THERAPEUTIC EXERCISES: CPT

## 2021-02-17 NOTE — PROGRESS NOTES
PT DAILY TREATMENT NOTE - [facility]     Patient Name: Rebecca Scales  Date:2021  : 1960  [x]  Patient  Verified  Payor: BLUE CROSS MEDICARE / Plan: [facility] HMO / Product Type: Managed Care Medicare /    Treatment Area: Low back pain [M54.5]  Cervicalgia [M54.2]   Next MD APPT: ? In time: 2:37  pm  Out time: 3:30  pm  Total Treatment Time (min): 53  Total Timed Codes (min): 53  1:1 Treatment Time ( [address] only): 48    Visit #:   Visit count could not be calculated. Make sure you are using a visit which is associated with an episode. SUBJECTIVE  Pain Level (0-10 scale) pre treatment: 3/10 with head turns only  Any medication changes, allergies to medications, adverse drug reactions, diagnosis change, or new procedure performed?: [x] No    [] Yes (see summary sheet for update)  Subjective functional status/changes:   [] No changes reported  Reports pain level 3/10 with head turn towards left>right side, but she feels much better and has more ease. She is doing her exercises too. Took off the tape this morning.         OBJECTIVE    Modality rationale: decrease edema, decrease inflammation, decrease pain and increase tissue extensibility to improve the patients ability to turn her head with ADLs   Min Type Additional Details    [] Estim: []UnAtt   []Att       []TENS instruct                  []IFC  []Premod   []NMES                     []Other:  []w/US   []w/ice   []w/heat  Position:  Location:     []  Ice     []  Heat  []  Ice massage Position:  Location:    []  Traction: [] Cervical       []Lumbar                       [] Prone          []Supine                       []Intermittent   []Continuous Lbs:  []w/heat  []W/heat and Estim    []  Ultrasound: []Continuous   [] Pulsed at:                           []1MHz   []3MHz Location:  W/cm2:      [] Skin assessment post-treatment:   []intact  []redness- no adverse reaction   []redness - adverse reaction:      * Decline heat/ES today 30 min Therapeutic Exercise:  [x] See flow sheet :   Rationale: increase ROM, increase strength and improve coordination to improve the patients ability to turn neck without pain and discomfort during her ADLs    25 min Manual Therapy: B UT with rock tool and muscle energy techniques and joint mobs at C1-C2 rot  and C3-C5 SB in sitting   Rationale: decrease pain, increase ROM, increase tissue extensibility and decrease trigger points to improve the patients ability to move neck with less discomfort      With   [x] TE   [] TA   [] Neuro   [] SC   [] other: Patient Education: [x] Review HEP    [] Progressed/Changed HEP based on:   [] positioning   [] body mechanics   [] transfers   [] Use of heat/ice    [] other:          Other Objective/Functional Measures:   Verbal and tactile cues to decreased B shoulder hike with neck ROM and exercises provided. Pain Level (0-10 scale) post treatment: 1-2/10      ASSESSMENT/Changes in Function:    Patient continue to progress with exercises, requires frequent verbal/tactile cues to decreased shoulders hiking and trunk compensation with exercises and muscle energy. Patient presents with limited SB to R>L and rotation to L>R with ROM, patient benefited from joint mob and manual technique at end range to increased ROM. She was able to tolerate MT with rock tool on B UT and levator. Increase trigger points noted R > L today. No tape performed today reassess next session. Patient declined modalities today and reported decrease in pain post session. Patient will continue to benefit from skilled PT services to modify and progress therapeutic interventions, address functional mobility deficits, address ROM deficits, address strength deficits, analyze and address soft tissue restrictions, analyze and cue movement patterns and assess and modify postural abnormalities to attain remaining goals.      []  See Plan of Care  []  See progress note/recertification  []  See Discharge Summary         GOALS/Progress towards goals:  Short Term Goals: To be accomplished in 2-4  treatments:  1. Patient will be independent with HEP to progress with POC [x] Met [] Not met [] Partially met   2. Patient pain level with decreased by 2 points on the VAS pain scale </=4/10 [] Met [] Not met [] Partially met   3. Patient will improve neck SB/ROT ROM by 5-10 deg to improve neck mobility [] Met [] Not met [] Partially met      Long Term Goals: To be accomplished in 8  treatments:  1. Patient will be able to turn her head while driving with less pain and difficulty [] Met [] Not met [] Partially met   2. Patient will report less neck pain with her ADL's and IADL's to improve her mobility [] Met [] Not met [] Partially met   3. Patient will reports less neck pain and discomfort while sleeping to improve her quality of life. [] Met [] Not met [] Partially met   4 Patient will report no headaches or less frequent headaches to improve her quality of life. [] Met [] Not met [] Partially met        PLAN  [x]  Upgrade activities as tolerated     [x]  Continue plan of care  []  Update interventions per flow sheet       []  Discharge due to:_  []  Other:_      Laura Whittington, PT 2/17/2021

## 2021-02-19 ENCOUNTER — APPOINTMENT (OUTPATIENT)
Dept: PHYSICAL THERAPY | Age: 61
End: 2021-02-19
Payer: MEDICARE

## 2021-02-22 ENCOUNTER — HOSPITAL ENCOUNTER (OUTPATIENT)
Dept: PHYSICAL THERAPY | Age: 61
Discharge: HOME | End: 2021-02-22
Payer: MEDICARE

## 2021-02-22 PROCEDURE — 97110 THERAPEUTIC EXERCISES: CPT

## 2021-02-22 NOTE — PROGRESS NOTES
PT DAILY TREATMENT NOTE - [facility]     Patient Name: Becki Doshi  Date:2021  : 1960  [x]  Patient  Verified  Payor: BLUE CROSS MEDICARE / Plan: [address]  HMO / Product Type: Managed Care Medicare /    Treatment Area: Low back pain [M54.5]  Cervicalgia [M54.2]   Next MD APPT: ? In time: 2:30  pm  Out time: 3:20  pm  Total Treatment Time (min): 50  Total Timed Codes (min): 50  1:1 Treatment Time ([facility] only): 50    Visit #:   Visit count could not be calculated. Make sure you are using a visit which is associated with an episode. SUBJECTIVE  Pain Level (0-10 scale) pre treatment: 3-4/10 with head turns only  Any medication changes, allergies to medications, adverse drug reactions, diagnosis change, or new procedure performed?: [x] No    [] Yes (see summary sheet for update)  Subjective functional status/changes:   [] No changes reported  Reports 60% improvement since receiving therapy. Her pain levels at best 1-2/10, worse 3-4/10. She states she is only able to sleep 2-3 hours, sometimes 4 hours then wakes up due to pain. She reports her HA are less frequent now. She states she still catches herself turning her body . She states most of her pain is with rotation and lateral flexion. She only drives short distances and as needed. She is slow and careful when she performs ADL's.                                                                        50 min Therapeutic Exercise:  [x] See flow sheet :   Rationale: increase ROM, increase strength and improve coordination to improve the patients ability to turn neck without pain and discomfort during her ADLs     min Manual Therapy: B UT with rock tool and muscle energy techniques and joint mobs at C1-C2 rot  and C3-C5 SB in sitting   Rationale: decrease pain, increase ROM, increase tissue extensibility and decrease trigger points to improve the patients ability to move neck with less discomfort      With   [] TE   [] TA   [] Neuro   [] SC   [x] other: Patient Education: [x] Review HEP    [] Progressed/Changed HEP based on:   [] positioning   [] body mechanics   [] transfers   [] Use of heat/ice    [x] other: Reassessed and given tennis ball to help with MT         Other Objective/Functional Measures: OBJECTIVE    Physical Findings   Ortho:   Posture:  Rounded sholders, slight right shoulder hike   Palpation: Right upper trap tender to palpation with  trigger points noted on UT     Spine: *normal values in ()  CERVICAL                                                ROM                                  Strength        Flexion  30      Extension 45      Protraction WFL       Retraction WFL           R L R L   Lateral Flexion (45) 25 20       Rotation (90) 40 45       Additional comments: guarded with PROM in supine     Specific joints:   SHOULDER                                         AROM                        PROM                       MMT    R L R L R L   Flexion (180) WFL     5 5   Extension (60)               Abduction (180) WFL       5 5   Adduction (0)               IR (70) WFL       5 5   ER (90) WFL        5 5   Horizontal Abduction (130)               Horizontal Adduction (45)               Additional comments:               strength: R 40 lbs      L 38 lbs patient is R hand dominant  Neck disability index score 36 % (50)    Pain Level (0-10 scale) post treatment: 3/10      ASSESSMENT/Changes in Function:    Patient progressing with goals reporting HA less frequent now, able to perform some ADL's with less pain. She has made slight improvement with ROM. She is still compensating with trunk motions. She also still has large trigger point on R UT. She has made improvements with MMT to shoulders.  Neck disability index 36/50, difficulty lifting and sleeping at night affecting her the most.    Patient will continue to benefit from skilled PT services to modify and progress therapeutic interventions, address functional mobility deficits, address ROM deficits, address strength deficits, analyze and address soft tissue restrictions, analyze and cue movement patterns and assess and modify postural abnormalities to attain remaining goals. []  See Plan of Care  []  See progress note/recertification  []  See Discharge Summary         GOALS/Progress towards goals:  Short Term Goals: To be accomplished in 2-4  treatments:  1. Patient will be independent with HEP to progress with POC [x] Met [] Not met [] Partially met   2. Patient pain level with decreased by 2 points on the VAS pain scale </=4/10 [] Met [x] Not met [] Partially met   3. Patient will improve neck SB/ROT ROM by 5-10 deg to improve neck mobility [] Met [x] Not met [] Partially met      Long Term Goals: To be accomplished in 8  treatments:  1. Patient will be able to turn her head while driving with less pain and difficulty [] Met [x] Not met [] Partially met Patient still turning body when driving  2. Patient will report less neck pain with her ADL's and IADL's to improve her mobility [] Met [] Not met [x] Partially met Progressing depends on activity  3. Patient will reports less neck pain and discomfort while sleeping to improve her quality of life. [] Met [x] Not met [] Partially met Progressing  4 Patient will report no headaches or less frequent headaches to improve her quality of life.  [x] Met [] Not met [] Partially met        PLAN  [x]  Upgrade activities as tolerated     [x]  Continue plan of care  []  Update interventions per flow sheet       []  Discharge due to:_  []  Other:_      Cheryl Burgess, PTA 2/22/2021

## 2021-02-24 ENCOUNTER — HOSPITAL ENCOUNTER (OUTPATIENT)
Dept: PHYSICAL THERAPY | Age: 61
Discharge: HOME | End: 2021-02-24
Payer: MEDICARE

## 2021-02-24 PROCEDURE — 97140 MANUAL THERAPY 1/> REGIONS: CPT

## 2021-02-24 PROCEDURE — 97110 THERAPEUTIC EXERCISES: CPT

## 2021-02-24 NOTE — PROGRESS NOTES
PT DAILY TREATMENT NOTE - [facility]     Patient Name: Conchita Young  Date:2021  : 1960  [x]  Patient  Verified  Payor: BLUE CROSS MEDICARE / Plan: [address] [facility] HMO / Product Type: Managed Care Medicare /    Treatment Area: Low back pain [M54.5]  Cervicalgia [M54.2]   Next MD APPT: ? In time: 2:29  pm  Out time: 3:10 pm  Total Treatment Time (min):40  Total Timed Codes (min):40  1:1 Treatment Time ( [address] only): 40    Visit #: 1/2 Visit count could not be calculated. Make sure you are using a visit which is associated with an episode. SUBJECTIVE  Pain Level (0-10 scale) pre treatment: 3/10 with head turns only  Any medication changes, allergies to medications, adverse drug reactions, diagnosis change, or new procedure performed?: [x] No    [] Yes (see summary sheet for update)  Subjective functional status/changes:   [] No changes reported  Patient stated she had another MD (lung) appointment following therapy and needed to leave a little early. She reports she has been using the tennis ball at home for manual therapy to her R UT area. \"I love it , it's has helped. \"                                                                      30 min Therapeutic Exercise:  [x] See flow sheet :   Rationale: increase ROM, increase strength and improve coordination to improve the patients ability to turn neck without pain and discomfort during her ADLs    10 min Manual Therapy: B UT with rock tool and muscle energy techniques    Rationale: decrease pain, increase ROM, increase tissue extensibility and decrease trigger points to improve the patients ability to move neck with less discomfort      With   [] TE   [] TA   [] Neuro   [] SC   [] other: Patient Education: [x] Review HEP    [] Progressed/Changed HEP based on:   [] positioning   [] body mechanics   [] transfers   [] Use of heat/ice    [x] other:          Other Objective/Functional Measures:   Pain Level (0-10 scale) post treatment: 0/10      ASSESSMENT/Changes in Function:    Patient reports tennis ball given to work on trigger point to R UT has helped. Patient was also less TTP along R UT today. She still needs tactile cues to not hike shoulders or rotate trunk  with exercises . Had patient stabilize her trunk to decrease rotation of trunk with exercises. Patient did better with exercises today but still has limited cervical ROM. Patient will continue to benefit from skilled PT services to modify and progress therapeutic interventions, address functional mobility deficits, address ROM deficits, address strength deficits, analyze and address soft tissue restrictions, analyze and cue movement patterns and assess and modify postural abnormalities to attain remaining goals. []  See Plan of Care  []  See progress note/recertification  []  See Discharge Summary         GOALS/Progress towards goals:  Short Term Goals: To be accomplished in 2-4  treatments:  1. Patient will be independent with HEP to progress with POC [x] Met [] Not met [] Partially met   2. Patient pain level with decreased by 2 points on the VAS pain scale </=4/10 [] Met [x] Not met [] Partially met   3. Patient will improve neck SB/ROT ROM by 5-10 deg to improve neck mobility [] Met [x] Not met [] Partially met      Long Term Goals: To be accomplished in 8  treatments:  1. Patient will be able to turn her head while driving with less pain and difficulty [] Met [x] Not met [] Partially met Patient still turning body when driving  2. Patient will report less neck pain with her ADL's and IADL's to improve her mobility [] Met [] Not met [x] Partially met Progressing depends on activity  3. Patient will reports less neck pain and discomfort while sleeping to improve her quality of life. [] Met [x] Not met [] Partially met Progressing  4 Patient will report no headaches or less frequent headaches to improve her quality of life.  [x] Met [] Not met [] Partially met        PLAN  [x] Upgrade activities as tolerated     [x]  Continue plan of care  []  Update interventions per flow sheet       []  Discharge due to:_  []  Other:_      Cheryl Burgess, PTA 2/24/2021

## 2021-03-03 ENCOUNTER — HOSPITAL ENCOUNTER (OUTPATIENT)
Dept: PHYSICAL THERAPY | Age: 61
Discharge: HOME | End: 2021-03-03
Payer: MEDICARE

## 2021-03-03 PROCEDURE — 97110 THERAPEUTIC EXERCISES: CPT

## 2021-03-03 PROCEDURE — 97140 MANUAL THERAPY 1/> REGIONS: CPT

## 2021-03-03 NOTE — PROGRESS NOTES
PT DAILY TREATMENT NOTE - [facility]     Patient Name: Brian Mendez  Date:3/3/2021  : 1960  [x]  Patient  Verified  Payor: BLUE CROSS MEDICARE / Plan: [facility] [place]  HMO / Product Type: Managed Care Medicare /    Treatment Area: Low back pain [M54.5]  Cervicalgia [M54.2]   Next MD APPT: ? In time: 3:35  pm  Out time: 4:30 pm  Total Treatment Time (min):55  Total Timed Codes (min):55  1:1 Treatment Time ( [address] only): 55    Visit #: 2/2 Visit count could not be calculated. Make sure you are using a visit which is associated with an episode. SUBJECTIVE  Pain Level (0-10 scale) pre treatment: 3/10 with head turns only  Any medication changes, allergies to medications, adverse drug reactions, diagnosis change, or new procedure performed?: [x] No    [] Yes (see summary sheet for update)  Subjective functional status/changes:   [] No changes reported  Patient reports neck pain about 2/10 with head movement. She reports she has been using the tennis ball at home for manual therapy to her R UT area and its been helping. Patient reports therapy is helpful and it is helping, her neck feels much better but she still has some pain with movement.                                25 min Therapeutic Exercise:  [x] See flow sheet :   Rationale: increase ROM, increase strength and improve coordination to improve the patients ability to turn neck without pain and discomfort during her ADLs    20 min Manual Therapy: B UT with rock tool and muscle energy techniques    Rationale: decrease pain, increase ROM, increase tissue extensibility and decrease trigger points to improve the patients ability to move neck with less discomfort      With   [] TE   [] TA   [] Neuro   [] SC   [] other: Patient Education: [x] Review HEP    [] Progressed/Changed HEP based on:   [] positioning   [] body mechanics   [] transfers   [] Use of heat/ice    [x] other:          Other Objective/Functional Measures:   Limits neck ROM with SB/ ROT/ and flexion with increased more trunk/shoulder compensation  Perofrmed neck Rot in standing to assist stabilizing trunk     Re-assessment 3/3  OBJECTIVE     Physical Findings   Ortho:   Posture:  Rounded sholders, slight right shoulder hike   Palpation: TTP on B SCM, B scalene, deep flexor muscles with increased trigger points, mild TTP on R>L UT with improvement.     Spine: *normal values in ()  CERVICAL                                                ROM                                  Strength        Flexion  30 increased tighenss in posterior neck      Extension 55     Protraction WFL       Retraction WFL           R L R L   Lateral Flexion (45) 20 20       Rotation (90) 46p! 50       Additional comments: guarded with PROM in supine  No much change continues to hike bilateral shoulder with A/PROM due to report of tightness in the neck SCM area.      Specific joints:   SHOULDER                                         AROM                        PROM                       MMT    R L R L R L   Flexion (180) WFL       5 5   Extension (60)               Abduction (180) WFL       5 5   Adduction (0)               IR (70) WFL       5 5   ER (90) WFL        5 5   Horizontal Abduction (130)               Horizontal Adduction (45)               Additional comments:                strength: R 40 lbs      L 38 lbs patient is R hand dominant  Neck disability index score 32%  (50)  The original report provided scoring intervals for interpretation, as follows:   0 - 4 = no disability  5 - 14 = mild  15 - 24 = moderate  25 - 34 = severe  above 34 = complete. Please note: This means 15-24 out of 50 (the RAW SCORE) equates with moderate disability.        Pain Level (0-10 scale) post treatment: 0/10       ASSESSMENT/Changes in Function:    During today's reassessment: Patient continues to present with limited neck ROM in side bending, rotation and flexion.  We noted increased trigger points in SCM, scalenes, upper trap and levator muscles. Patient reports slight difficulty sleeping and repositioning in bed, driving and turning her neck and doing some of her ADLs. Patient stated that since start of therapy she feels better however, she still has some pain with movement and stated she just taught its normal to have neck pain. Due to lack of ROM, patient presents with increased trunk rotation and B shoulder hiking as a result of compensation with movement. . She still needs mod/max tactile cues to not hike shoulders or rotate trunk  with exercises . We had patient stabilize her trunk in sitting/standing to decrease rotation of trunk with exercises. Patient is improving but will continue to benefit form continuation of therapy to assist in improving her quality of life and pain reduction. HEP with stretches provided, patient encouraged to do them daily 2 a day and use heat to reduce tightness. Patient will continue to benefit from skilled PT services to modify and progress therapeutic interventions, address functional mobility deficits, address ROM deficits, address strength deficits, analyze and address soft tissue restrictions, analyze and cue movement patterns and assess and modify postural abnormalities to attain remaining goals. []  See Plan of Care  []  See progress note/recertification  []  See Discharge Summary         GOALS/Progress towards goals:  Short Term Goals: To be accomplished in 2-4  treatments:  1. Patient will be independent with HEP to progress with POC [x] Met [] Not met [] Partially met   2. Patient pain level with decreased by 2 points on the VAS pain scale </=4/10 [x] Met [] Not met [] Partially met   3. Patient will improve neck SB/ROT ROM by 5-10 deg to improve neck mobility [] Met [x] Not met [] Partially met      Long Term Goals: To be accomplished in 8  treatments:  1.  Patient will be able to turn her head while driving with less pain and difficulty [] Met [] Not met [x] Partially met   She still find her self turning her body and she is scared from the pain. 2. Patient will report less neck pain with her ADL's and IADL's to improve her mobility [] Met [] Not met [x] Partially met It all depends on what she is doing. Progressing depends on activity    3. Patient will reports less neck pain and discomfort while sleeping to improve her quality of life. [] Met [] Not met [x] Partially met she still has pain and use 1-2 depending on pain level. 4 Patient will report no headaches or less frequent headaches to improve her quality of life.  [x] Met [] Not met [] Partially met        PLAN  [x]  Upgrade activities as tolerated     [x]  Continue plan of care  []  Update interventions per flow sheet       []  Discharge due to:_  []  Other:_      Laura Whittington, PT, DPT 3/3/2021

## 2021-03-03 NOTE — PROGRESS NOTES
[address] [address] [address] [facility] [address] [facility], [address] [place] [address]  Ph: [phone]    Fax: [phone]  Plan of Care  Name: Dick Young  : 1960   MD: Monica Bob [place]     Medical/Treatment Diagnosis: Low back pain [M54.5]  Cervicalgia [M54.2]     Problem List/Impairments: pain affecting function, decrease ROM, decrease strength, impaired gait/ balance and decrease flexibility/ joint mobility    Start of Care: 2020 Visits from Start of Care: 10 Missed Visits: 0  Certification Period: 3/3/2021- 5/3/2021    Frequency/Duration: 2 times a week for 12 treatments   Treatment Plan may include any combination of the following: Therapeutic exercise, Neuromuscular re-education, Physical agent/modality, Manual therapy and Patient education  [x]  Plan of care has been reviewed with PTA. Patient/ Caregiver education and instruction: exercises  Summary of Care/Goals:    ASSESSMENT/Changes in Function:    During today's reassessment: Patient continues to present with limited neck ROM in side bending, rotation and flexion. We noted increased trigger points in SCM, scalenes, upper trap and levator muscles. Patient reports slight difficulty sleeping and repositioning in bed, driving and turning her neck and doing some of her ADLs. Patient stated that since start of therapy she feels better however, she still has some pain with movement and stated she just taught its normal to have neck pain. Due to lack of ROM, patient presents with increased trunk rotation and B shoulder hiking as a result of compensation with movement. . She still needs mod/max tactile cues to not hike shoulders or rotate trunk  with exercises . We had patient stabilize her trunk in sitting/standing to decrease rotation of trunk with exercises. Patient is improving but will continue to benefit form continuation of therapy to assist in improving her quality of life and pain reduction.  HEP with stretches provided, patient encouraged to do them daily 2 a day and use heat to reduce tightness.   Patient will continue to benefit from skilled PT services to modify and progress therapeutic interventions, address functional mobility deficits, address ROM deficits, address strength deficits, analyze and address soft tissue restrictions, analyze and cue movement patterns and assess and modify postural abnormalities to attain remaining goals.            Other Objective/Functional Measures:   Limits neck ROM with SB/ ROT/ and flexion with increased more trunk/shoulder compensation  Perofrmed neck Rot in standing to assist stabilizing trunk      Re-assessment 3/3  OBJECTIVE      Physical Findings   Ortho:   Posture:  Rounded sholders, slight right shoulder hike   Palpation: TTP on B SCM, B scalene, deep flexor muscles with increased trigger points, mild TTP on R>L UT with improvement.     Spine: *normal values in ()  CERVICAL                                                ROM                                  Strength        Flexion  30 increased tighenss in posterior neck      Extension 55     Protraction WFL       Retraction WFL           R L R L   Lateral Flexion (45) 20 20       Rotation (90) 46p! 50       Additional comments: guarded with PROM in supine  No much change continues to hike bilateral shoulder with A/PROM due to report of tightness in the neck SCM area.      Specific joints:   SHOULDER                                         AROM                        PROM                       MMT    R L R L R L   Flexion (180) WFL       5 5   Extension (60)               Abduction (180) WFL       5 5   Adduction (0)               IR (70) WFL       5 5   ER (90) WFL        5 5   Horizontal Abduction (130)               Horizontal Adduction (45)               Additional comments:                strength: R 40 lbs      L 38 lbs patient is R hand dominant  Neck disability index score 32%  (50)  The original report provided scoring intervals for interpretation, as follows:   0 - 4 = no disability  5 - 14 = mild  15 - 24 = moderate  25 - 34 = severe  above 34 = complete. Please note: This means 15-24 out of 50 (the RAW SCORE) equates with moderate disability.         GOALS/Progress towards goals:  Short Term Goals: To be accomplished in 2-4  treatments:  1. Patient will be independent with HEP to progress with POC [x]? Met []? Not met []? Partially met   2. Patient pain level with decreased by 2 points on the VAS pain scale </=4/10 [x]? Met []? Not met []? Partially met   3. Patient will improve neck SB/ROT ROM by 5-10 deg to improve neck mobility []? Met [x]? Not met []? Partially met      Long Term Goals: To be accomplished in 8  treatments:  1. Patient will be able to turn her head while driving with less pain and difficulty []? Met []? Not met [x]? Partially met   She still find her self turning her body and she is scared from the pain.      2. Patient will report less neck pain with her ADL's and IADL's to improve her mobility []? Met []? Not met [x]? Partially met It all depends on what she is doing. Progressing depends on activity     3. Patient will reports less neck pain and discomfort while sleeping to improve her quality of life. []? Met []? Not met [x]? Partially met she still has pain and use 1-2 depending on pain level. 4 Patient will report no headaches or less frequent headaches to improve her quality of life. [x]? Met []? Not met []? Partially met            Recommendations: Continue with therapy     Laura Whittington, PT, DPT 3/3/2021     Retain this original for your records. If you are unable to process this request in 24 hours, please contact our office.   ________________________________________________________________________  NOTE TO PHYSICIAN:  Please complete the following and fax to:   [address]:  Fax: [phone]   ____ I have read the above report and request that my patient continue therapy. ____ I have read the above report and request that my patient continue therapy with the following changes/special instructions:    ____ I have read the above report and request that my patient be discharged from therapy.     Physician's Signature:_______________________________________________ Date:_____________Time:____________      MELY Harris

## 2021-03-09 ENCOUNTER — TRANSCRIBE ORDER (OUTPATIENT)
Dept: SCHEDULING | Age: 61
End: 2021-03-09

## 2021-03-09 DIAGNOSIS — Z12.31 BREAST CANCER SCREENING BY MAMMOGRAM: Primary | ICD-10-CM

## 2021-03-16 ENCOUNTER — HOSPITAL ENCOUNTER (OUTPATIENT)
Dept: PHYSICAL THERAPY | Age: 61
Discharge: HOME | End: 2021-03-16
Payer: MEDICARE

## 2021-03-16 PROCEDURE — 97110 THERAPEUTIC EXERCISES: CPT

## 2021-03-16 PROCEDURE — 97140 MANUAL THERAPY 1/> REGIONS: CPT

## 2021-03-16 NOTE — PROGRESS NOTES
PT DAILY TREATMENT NOTE - [facility]     Patient Name: Codi Gunn  Date:3/16/2021  : 1960  [x]  Patient  Verified  Payor: BLUE CROSS MEDICARE / Plan: [facility]  HMO / Product Type: Managed Care Medicare /    Treatment Area: Cervicalgia [M54.2]   Next MD APPT: ? In time: 2:05  pm  Out time:2:54 pm  Total Treatment Time (min):49 min  Total Timed Codes (min):49  1:1 Treatment Time [facility] only):49  Visit #:  (POC)    (ins )   Visit count could not be calculated. Make sure you are using a visit which is associated with an episode. SUBJECTIVE  Pain Level (0-10 scale) pre treatment:2/10   Any medication changes, allergies to medications, adverse drug reactions, diagnosis change, or new procedure performed?: [x] No    [] Yes (see summary sheet for update)  Subjective functional status/changes:   [] No changes reported  Patient stated she has not been here a week. She was waiting for insurance authorization. \"I've been doing my exercises and I think I'm getting better. \" Patient stated she is getting her COVID vaccine today.                              39 min Therapeutic Exercise:  [x] See flow sheet :   Rationale: increase ROM, increase strength and improve coordination to improve the patients ability to turn neck without pain and discomfort during her ADLs    10 min Manual Therapy: B UT with rock tool and muscle energy techniques    Rationale: decrease pain, increase ROM, increase tissue extensibility and decrease trigger points to improve the patients ability to move neck with less discomfort      With   [x] TE   [] TA   [] Neuro   [] SC   [] other: Patient Education: [] Review HEP    [] Progressed/Changed HEP based on:   [] positioning   [] body mechanics   [] transfers   [] Use of heat/ice    [x] other: Taped R UT         Other Objective/Functional Measures:    Pain Level (0-10 scale) post treatment: 0/10       ASSESSMENT/Changes in Function:    Patient was able to perform exercises today with no trunk rotation. She still shrugs her shoulders. She reported decrease pain following exercises but has tightness to R UT area. R UT area still TTP , relief noted post MT and with taping. Patient has 3 more visits per insurance, discussed d/c with patient. Patient will continue to benefit from skilled PT services to modify and progress therapeutic interventions, address functional mobility deficits, address ROM deficits, address strength deficits, analyze and address soft tissue restrictions, analyze and cue movement patterns and assess and modify postural abnormalities to attain remaining goals. []  See Plan of Care  []  See progress note/recertification  []  See Discharge Summary         GOALS/Progress towards goals:  Short Term Goals: To be accomplished in 2-4  treatments:  1. Patient will be independent with HEP to progress with POC [x] Met [] Not met [] Partially met   2. Patient pain level with decreased by 2 points on the VAS pain scale </=4/10 [x] Met [] Not met [] Partially met   3. Patient will improve neck SB/ROT ROM by 5-10 deg to improve neck mobility [] Met [x] Not met [] Partially met      Long Term Goals: To be accomplished in 8  treatments:  1. Patient will be able to turn her head while driving with less pain and difficulty [] Met [] Not met [x] Partially met   She still find her self turning her body and she is scared from the pain. 2. Patient will report less neck pain with her ADL's and IADL's to improve her mobility [] Met [] Not met [x] Partially met It all depends on what she is doing. Progressing depends on activity    3. Patient will reports less neck pain and discomfort while sleeping to improve her quality of life. [] Met [] Not met [x] Partially met she still has pain and use 1-2 depending on pain level. 4 Patient will report no headaches or less frequent headaches to improve her quality of life.  [x] Met [] Not met [] Partially met        PLAN  [x] Upgrade activities as tolerated     [x]  Continue plan of care  [x]  Update interventions per flow sheet       []  Discharge due to:_  []  Other:_      Cheryl Burgess, PTA 3/16/2021

## 2021-03-19 ENCOUNTER — HOSPITAL ENCOUNTER (OUTPATIENT)
Dept: PHYSICAL THERAPY | Age: 61
Discharge: HOME | End: 2021-03-19
Payer: MEDICARE

## 2021-03-19 PROCEDURE — 97110 THERAPEUTIC EXERCISES: CPT

## 2021-03-19 PROCEDURE — 97140 MANUAL THERAPY 1/> REGIONS: CPT

## 2021-03-19 NOTE — PROGRESS NOTES
PT DAILY TREATMENT NOTE - [facility]     Patient Name: Fuad Edwards  Date:3/19/2021  : 1960  [x]  Patient  Verified  Payor: BLUE CROSS MEDICARE / Plan: [address]  HMO / Product Type: Managed Care Medicare /    Treatment Area: Cervicalgia [M54.2]   Next MD APPT: ? In time:1:45  pm  Out time:2:30 pm  Total Treatment Time (min):45min  Total Timed Codes (min):45 min  1:1 Treatment Time [facility] only):45 min  Visit #: 3/12 (POC)    (ins )   Visit count could not be calculated. Make sure you are using a visit which is associated with an episode. SUBJECTIVE  Pain Level (0-10 scale) pre treatment:2/10   Any medication changes, allergies to medications, adverse drug reactions, diagnosis change, or new procedure performed?: [x] No    [] Yes (see summary sheet for update)  Subjective functional status/changes:   [] No changes reported  Patient stated she did get her vaccine and did not get too sore. She states she just has her usual pain but feel she is getting better overall. \"I'm still doing my exercises and working on not moving my body or lifting my shoulders. \"                          35 min Therapeutic Exercise:  [x] See flow sheet :   Rationale: increase ROM, increase strength and improve coordination to improve the patients ability to turn neck without pain and discomfort during her ADLs    10 min Manual Therapy: R UT    Rationale: decrease pain, increase ROM, increase tissue extensibility and decrease trigger points to improve the patients ability to move neck with less discomfort      With   [] TE   [] TA   [] Neuro   [] SC   [] other: Patient Education: [] Review HEP    [] Progressed/Changed HEP based on:   [] positioning   [] body mechanics   [] transfers   [] Use of heat/ice    [] other:          Other Objective/Functional Measures:    Pain Level (0-10 scale) post treatment: 0/10       ASSESSMENT/Changes in Function:    Patient still progressing well with exercise and less substitution noted. She was not as TTP along R UT area. Patient will continue to benefit from skilled PT services to modify and progress therapeutic interventions, address functional mobility deficits, address ROM deficits, address strength deficits, analyze and address soft tissue restrictions, analyze and cue movement patterns and assess and modify postural abnormalities to attain remaining goals. []  See Plan of Care  []  See progress note/recertification  []  See Discharge Summary         GOALS/Progress towards goals:  Short Term Goals: To be accomplished in 2-4  treatments:  1. Patient will be independent with HEP to progress with POC [x] Met [] Not met [] Partially met   2. Patient pain level with decreased by 2 points on the VAS pain scale </=4/10 [x] Met [] Not met [] Partially met   3. Patient will improve neck SB/ROT ROM by 5-10 deg to improve neck mobility [] Met [x] Not met [] Partially met      Long Term Goals: To be accomplished in 8  treatments:  1. Patient will be able to turn her head while driving with less pain and difficulty [] Met [] Not met [x] Partially met   She still find her self turning her body and she is scared from the pain. 2. Patient will report less neck pain with her ADL's and IADL's to improve her mobility [] Met [] Not met [x] Partially met It all depends on what she is doing. Progressing depends on activity    3. Patient will reports less neck pain and discomfort while sleeping to improve her quality of life. [] Met [] Not met [x] Partially met she still has pain and use 1-2 depending on pain level. 4 Patient will report no headaches or less frequent headaches to improve her quality of life.  [x] Met [] Not met [] Partially met        PLAN  [x]  Upgrade activities as tolerated     [x]  Continue plan of care  [x]  Update interventions per flow sheet       []  Discharge due to:_  []  Other:_      Cheryl Burgess, PTA 3/19/2021

## 2021-03-23 ENCOUNTER — HOSPITAL ENCOUNTER (OUTPATIENT)
Dept: PHYSICAL THERAPY | Age: 61
Discharge: HOME | End: 2021-03-23
Payer: MEDICARE

## 2021-03-23 PROCEDURE — 97110 THERAPEUTIC EXERCISES: CPT

## 2021-03-23 PROCEDURE — 97014 ELECTRIC STIMULATION THERAPY: CPT

## 2021-03-23 PROCEDURE — 97140 MANUAL THERAPY 1/> REGIONS: CPT

## 2021-03-23 NOTE — PROGRESS NOTES
PT DAILY TREATMENT NOTE - [facility]     Patient Name: Frank Celeste  Date:3/23/2021  : 1960  [x]  Patient  Verified  Payor: BLUE CROSS MEDICARE / Plan: [facility] HMO / Product Type: Managed Care Medicare /    Treatment Area: Cervicalgia [M54.2]   Next MD APPT: ? In time:1:45 pm  Out time:2:55 pm  Total Treatment Time (min): 65min  Total Timed Codes (min):45 min  1:1 Treatment Time [facility]):45 min  Visit #: 3/12 (POC)    (ins )   Visit count could not be calculated. Make sure you are using a visit which is associated with an episode. SUBJECTIVE  Pain Level (0-10 scale) pre treatment:2/10 with neck movement   Any medication changes, allergies to medications, adverse drug reactions, diagnosis change, or new procedure performed?: [x] No    [] Yes (see summary sheet for update)  Subjective functional status/changes:   [] No changes reported  Stated that overall she is doing better, her neck hurt with some movement but overall she is doing and feeling much better. Modality rationale: decrease edema, decrease inflammation, decrease pain, increase tissue extensibility and increase muscle contraction/control to improve the patients ability to SB/ROt neck with less pain   Min Type Additional Details      15 [x] Estim: []Att   []Unatt    []TENS instruct                  []IFC  [x]Premod   []NMES                     []Other:  []w/US   []w/ice   [x]w/heat  Position:sitting  Location: ES on RUT only and heat on BUT.        []  Traction: [] Cervical       []Lumbar                       [] Prone          []Supine                       []Intermittent   []Continuous Lbs:  [] before manual  [] after manual  []w/heat    []  Ultrasound: []Continuous   [] Pulsed                       at: []1MHz   []3MHz Location:  W/cm2:    [] Paraffin         Location:   []w/heat    []  Ice     []  Heat  []  Ice massage Position:  Location:    []  Laser  []  Other: Position:  Location:      []  Vasopneumatic Device Pressure:       [] lo [] med [] hi   Temperature:      [x] Skin assessment post-treatment:  [x]intact []redness- no adverse reaction    []redness - adverse reaction:                            35 min Therapeutic Exercise:  [x] See flow sheet :   Rationale: increase ROM, increase strength and improve coordination to improve the patients ability to turn neck without pain and discomfort during her ADLs    10 min Manual Therapy: R UT    Rationale: decrease pain, increase ROM, increase tissue extensibility and decrease trigger points to improve the patients ability to move neck with less discomfort      With   [] TE   [] TA   [] Neuro   [] SC   [] other: Patient Education: [] Review HEP    [] Progressed/Changed HEP based on:   [] positioning   [] body mechanics   [] transfers   [] Use of heat/ice    [] other:          Other Objective/Functional Measures:  Noted improvement with cervical ROT /SB with mild shoulder elevation. Noted increased right UT elevation during MT. Kenisiotape for RUT inhibition      Pain Level (0-10 scale) post treatment: 0/10       ASSESSMENT/Changes in Function:    Patient still progressing well with exercise and less substitution noted however she continues to presents with increased trigger points on R>L UT during MT. Therapist used ES and heat post MT and encouraged patient to use heat at home pre/post stretches. Therapist also used KT for RUT inhibition, patient encouraged to continue heating and stretches. Patient has one more visit prior to D/C, if pain continues we will re-assess next week. Patient will continue to benefit from skilled PT services to modify and progress therapeutic interventions, address functional mobility deficits, address ROM deficits, address strength deficits, analyze and address soft tissue restrictions, analyze and cue movement patterns and assess and modify postural abnormalities to attain remaining goals.      []  See Plan of Care  []  See progress note/recertification  []  See Discharge Summary         GOALS/Progress towards goals:  Short Term Goals: To be accomplished in 2-4  treatments:  1. Patient will be independent with HEP to progress with POC [x] Met [] Not met [] Partially met   2. Patient pain level with decreased by 2 points on the VAS pain scale </=4/10 [x] Met [] Not met [] Partially met   3. Patient will improve neck SB/ROT ROM by 5-10 deg to improve neck mobility [] Met [x] Not met [] Partially met      Long Term Goals: To be accomplished in 8  treatments:  1. Patient will be able to turn her head while driving with less pain and difficulty [] Met [] Not met [x] Partially met   She still find her self turning her body and she is scared from the pain. 2. Patient will report less neck pain with her ADL's and IADL's to improve her mobility [] Met [] Not met [x] Partially met It all depends on what she is doing. Progressing depends on activity    3. Patient will reports less neck pain and discomfort while sleeping to improve her quality of life. [] Met [] Not met [x] Partially met she still has pain and use 1-2 depending on pain level. 4 Patient will report no headaches or less frequent headaches to improve her quality of life.  [x] Met [] Not met [] Partially met        PLAN  [x]  Upgrade activities as tolerated     [x]  Continue plan of care  [x]  Update interventions per flow sheet       []  Discharge due to:_  []  Other:_      Laura Whittington, PT, DPT 3/23/2021

## 2021-03-25 ENCOUNTER — HOSPITAL ENCOUNTER (OUTPATIENT)
Dept: PHYSICAL THERAPY | Age: 61
Discharge: HOME | End: 2021-03-25
Payer: MEDICARE

## 2021-03-25 PROCEDURE — 97110 THERAPEUTIC EXERCISES: CPT

## 2021-03-25 NOTE — PROGRESS NOTES
PT DAILY TREATMENT NOTE - [facility]     Patient Name: Bill Gloria  Date:3/25/2021  : 1960  [x]  Patient  Verified  Payor: BLUE CROSS MEDICARE / Plan: [address]  HMO / Product Type: Managed Care Medicare /    Treatment Area: Cervicalgia [M54.2]   Next MD APPT: ? In time:2:00 pm  Out time:2:40 pm  Total Treatment Time (min): 40min  Total Timed Codes (min):40 min  1:1 Treatment Time [facility] only):40min  Visit #:  (POC)    (ins )   Visit count could not be calculated. Make sure you are using a visit which is associated with an episode. SUBJECTIVE  Pain Level (0-10 scale) pre treatment:2/10 with neck movement   Any medication changes, allergies to medications, adverse drug reactions, diagnosis change, or new procedure performed?: [x] No    [] Yes (see summary sheet for update)  Subjective functional status/changes:   [] No changes reported  Patient reporting 85-90% improvement since receiving therapy. Her pain at best 0-2, worse 3/10. She is independent with HEP and how to help decrease pain and tightness . She is able to turn her neck now with no body movement. She still gets HA at least 1-2 a week. Modality rationale: decrease edema, decrease inflammation, decrease pain, increase tissue extensibility and increase muscle contraction/control to improve the patients ability to SB/ROt neck with less pain   Min Type Additional Details      15 [x] Estim: []Att   []Unatt    []TENS instruct                  []IFC  [x]Premod   []NMES                     []Other:  []w/US   []w/ice   [x]w/heat  Position:sitting  Location: ES on RUT only and heat on BUT.        []  Traction: [] Cervical       []Lumbar                       [] Prone          []Supine                       []Intermittent   []Continuous Lbs:  [] before manual  [] after manual  []w/heat    []  Ultrasound: []Continuous   [] Pulsed                       at: []1MHz   []3MHz Location:  W/cm2:    [] Paraffin         Location: []w/heat    []  Ice     []  Heat  []  Ice massage Position:  Location:    []  Laser  []  Other: Position:  Location:      []  Vasopneumatic Device Pressure:       [] lo [] med [] hi   Temperature:      [x] Skin assessment post-treatment:  [x]intact []redness- no adverse reaction    []redness - adverse reaction:                            35 min Therapeutic Exercise:  [x] See flow sheet :   Rationale: increase ROM, increase strength and improve coordination to improve the patients ability to turn neck without pain and discomfort during her ADLs    10 min Manual Therapy: R UT    Rationale: decrease pain, increase ROM, increase tissue extensibility and decrease trigger points to improve the patients ability to move neck with less discomfort      With   [] TE   [] TA   [] Neuro   [] SC   [] other: Patient Education: [] Review HEP    [] Progressed/Changed HEP based on:   [] positioning   [] body mechanics   [] transfers   [] Use of heat/ice    [] other:          Other Objective/Functional Measures:  Spine:     CERVICAL                                                ROM                                  Strength   Flexion  45    Extension 55    Protraction     Retraction        R L R L   Lateral Flexion 35 35     Rotation 40 45     Additional comments:     SHOULDER                                         AROM   PROM            MMT   R L R L R L   Flexion (180) WFL    5 5   Extension (60)         Abduction (180) WFL    5 5   Adduction (0)         IR (70) WFL    5 5   ER (90) WFL    5 5   Horizontal Abduction (130)         Horizontal Adduction (45)         Additional comments:    strength on R 45 lbs , L 40 lbs  Neck disability index score 16%  - ( still at moderate level  15-24 )     Pain Level (0-10 scale) post treatment: 2/10       ASSESSMENT/Changes in Function:    Patient has met all goals at present time . She has made improvement with ROM and MMT. She still has tightness/ points along R UT area.  She is compliant with HEP and will continue on her own. She is  using heat/cp at home as well as ball for  MT. She is able to turn head now without body movement to drive safely. She does have some discomfort with sleeping through the night only if she moves suddenly. HA have decreased to 1- 2 a week. Plan to discharge at present time with HEP. []  See Plan of Care  []  See progress note/recertification  []  See Discharge Summary         GOALS/Progress towards goals:  Short Term Goals: To be accomplished in 2-4  treatments:  1. Patient will be independent with HEP to progress with POC [x] Met [] Not met [] Partially met   2. Patient pain level with decreased by 2 points on the VAS pain scale </=4/10 [x] Met [] Not met [] Partially met   3. Patient will improve neck SB/ROT ROM by 5-10 deg to improve neck mobility [x] Met [] Not met [] Partially met      Long Term Goals: To be accomplished in 8  treatments:  1. Patient will be able to turn her head while driving with less pain and difficulty [x] Met [] Not met [] Partially met   2. Patient will report less neck pain with her ADL's and IADL's to improve her mobility [x] Met [] Not met [] Partially met  3. Patient will reports less neck pain and discomfort while sleeping to improve her quality of life. [x] Met [] Not met [] Partially met    4 Patient will report no headaches or less frequent headaches to improve her quality of life.  [x] Met [] Not met [] Partially met Less frequent 2 x week     PLAN  []  Upgrade activities as tolerated     []  Continue plan of care  []  Update interventions per flow sheet       [x]  Discharge due to:_  []  Other:_      Cheryl Burgess, PTA 3/25/2021

## 2021-03-25 NOTE — ANCILLARY DISCHARGE INSTRUCTIONS
[address] [address] [place] [facility] [address] [facility], [address] [place] [address]  Ph: [phone]  Fax: [phone]    Discharge Summary 2-15    Patient name: Bill Gloria  : 1960  Provider#: [id number]  Referral source: MELY Pineda      Medical/Treatment Diagnosis: Cervicalgia [M54.2]     Prior Hospitalization: see medical history     Comorbidities: See Plan of Care  Prior Level of Function: See Plan of Care  Medications: Verified on Patient Summary List  Start of Care: 21   Onset Date:10/20   Visits from Start of Care: 21 Missed Visits:   Reporting Period : 21 to 3/25/21    Assessment/Summary of care/GOALS:    Other Objective/Functional Measures:  Spine:     CERVICAL                                                ROM                                  Strength   Flexion  45    Extension 55    Protraction     Retraction        R L R L   Lateral Flexion 35 35     Rotation 40 45     Additional comments:     SHOULDER                                         AROM   PROM            MMT   R L R L R L   Flexion (180) WFL    5 5   Extension (60)         Abduction (180) WFL    5 5   Adduction (0)         IR (70) WFL    5 5   ER (90) WFL    5 5   Horizontal Abduction (130)         Horizontal Adduction (45)         Additional comments:      strength on R 45 lbs , L 40 lbs  Neck disability index score 16%  - ( still at moderate level  15-24 )    ASSESSMENT/Changes in Function:    Patient reporting 85-90% improvement since receiving therapy. Her pain at best 0-2, worse 3/10. She is able to turn her neck now with no body movement. She still gets HA at least 1-2 a week. Patient has met all goals at present time . She has made improvement with ROM and MMT. She still has tightness/ points along R UT area. She is compliant with HEP and will continue on her own. She is  using heat/cp at home as well as ball for  MT.  She is able to turn head now without body movement to drive safely. She does have some discomfort with sleeping through the night only if she moves suddenly. HA have decreased to 1- 2 a week. Plan to discharge at present time with HEP. GOALS/Progress towards goals:  Short Term Goals: To be accomplished in 2-4  treatments:  1. Patient will be independent with HEP to progress with POC [x] Met [] Not met [] Partially met   2. Patient pain level with decreased by 2 points on the VAS pain scale </=4/10 [x] Met [] Not met [] Partially met   3. Patient will improve neck SB/ROT ROM by 5-10 deg to improve neck mobility [x] Met [] Not met [] Partially met      Long Term Goals: To be accomplished in 8  treatments:  1. Patient will be able to turn her head while driving with less pain and difficulty [x] Met [] Not met [] Partially met   2. Patient will report less neck pain with her ADL's and IADL's to improve her mobility [x] Met [] Not met [] Partially met  3. Patient will reports less neck pain and discomfort while sleeping to improve her quality of life. [x] Met [] Not met [] Partially met    4 Patient will report no headaches or less frequent headaches to improve her quality of life.  [x] Met [] Not met [] Partially met Less frequent 2 x week     RECOMMENDATIONS:  [x]Discontinue therapy: [x]Patient has reached or is progressing toward set goals and is independent with HEP     []Patient is non-compliant or has abdicated     []Due to lack of appreciable progress towards set goals     []Other  Ivelisse See, PTA 3/25/2021

## 2021-03-31 ENCOUNTER — HOSPITAL ENCOUNTER (OUTPATIENT)
Dept: MAMMOGRAPHY | Age: 61
Discharge: HOME | End: 2021-03-31
Attending: PHYSICIAN ASSISTANT
Payer: MEDICARE

## 2021-03-31 DIAGNOSIS — Z12.31 BREAST CANCER SCREENING BY MAMMOGRAM: ICD-10-CM

## 2021-03-31 PROCEDURE — 77067 SCR MAMMO BI INCL CAD: CPT

## 2021-05-25 ENCOUNTER — TRANSCRIBE ORDER (OUTPATIENT)
Dept: SCHEDULING | Age: 61
End: 2021-05-25

## 2021-05-25 DIAGNOSIS — R92.8 ABNORMAL MAMMOGRAM: Primary | ICD-10-CM

## 2021-09-01 ENCOUNTER — HOSPITAL ENCOUNTER (OUTPATIENT)
Dept: MAMMOGRAPHY | Age: 61
Discharge: HOME | End: 2021-09-01
Attending: PHYSICIAN ASSISTANT
Payer: COMMERCIAL

## 2021-09-01 DIAGNOSIS — R92.8 ABNORMAL MAMMOGRAM: ICD-10-CM

## 2021-09-01 PROCEDURE — 77065 DX MAMMO INCL CAD UNI: CPT

## 2021-09-01 PROCEDURE — 76642 ULTRASOUND BREAST LIMITED: CPT

## 2021-09-03 ENCOUNTER — TRANSCRIBE ORDER (OUTPATIENT)
Dept: SCHEDULING | Age: 61
End: 2021-09-03

## 2021-09-03 DIAGNOSIS — R92.8 ABNORMAL MAMMOGRAM: Primary | ICD-10-CM

## 2021-09-13 ENCOUNTER — HOSPITAL ENCOUNTER (OUTPATIENT)
Dept: MAMMOGRAPHY | Age: 61
Discharge: HOME | End: 2021-09-13
Attending: PHYSICIAN ASSISTANT
Payer: COMMERCIAL

## 2021-09-13 DIAGNOSIS — R92.8 ABNORMAL MAMMOGRAM: ICD-10-CM

## 2021-09-13 PROCEDURE — 76882 US LMTD JT/FCL EVL NVASC XTR: CPT

## 2021-11-02 ENCOUNTER — HOSPITAL ENCOUNTER (OUTPATIENT)
Age: 61
Setting detail: OBSERVATION
Discharge: HOME | End: 2021-11-04
Attending: EMERGENCY MEDICINE | Admitting: FAMILY MEDICINE
Payer: MEDICARE

## 2021-11-02 ENCOUNTER — APPOINTMENT (OUTPATIENT)
Dept: CT IMAGING | Age: 61
End: 2021-11-02
Attending: STUDENT IN AN ORGANIZED HEALTH CARE EDUCATION/TRAINING PROGRAM
Payer: MEDICARE

## 2021-11-02 DIAGNOSIS — R47.1 DYSARTHRIA: Primary | ICD-10-CM

## 2021-11-02 DIAGNOSIS — G45.9 TIA (TRANSIENT ISCHEMIC ATTACK): ICD-10-CM

## 2021-11-02 LAB
ALBUMIN SERPL-MCNC: 3.8 G/DL (ref 3.5–5)
ALBUMIN/GLOB SERPL: 1.1 {RATIO} (ref 1.1–2.2)
ALP SERPL-CCNC: 77 U/L (ref 45–117)
ALT SERPL-CCNC: 23 U/L (ref 12–78)
ANION GAP SERPL CALC-SCNC: 5 MMOL/L (ref 5–15)
APPEARANCE UR: ABNORMAL
APTT PPP: 23.9 SEC (ref 21.2–34.1)
AST SERPL W P-5'-P-CCNC: 16 U/L (ref 15–37)
BACTERIA URNS QL MICRO: NEGATIVE /HPF
BASOPHILS # BLD: 0 K/UL (ref 0–0.1)
BASOPHILS NFR BLD: 0 % (ref 0–1)
BILIRUB SERPL-MCNC: 0.8 MG/DL (ref 0.2–1)
BILIRUB UR QL: NEGATIVE
BUN SERPL-MCNC: 13 MG/DL (ref 6–20)
BUN/CREAT SERPL: 20 (ref 12–20)
CA-I BLD-MCNC: 9.5 MG/DL (ref 8.5–10.1)
CHLORIDE SERPL-SCNC: 110 MMOL/L (ref 97–108)
CO2 SERPL-SCNC: 26 MMOL/L (ref 21–32)
COLOR UR: ABNORMAL
CREAT SERPL-MCNC: 0.66 MG/DL (ref 0.55–1.02)
DIFFERENTIAL METHOD BLD: ABNORMAL
EOSINOPHIL # BLD: 0.1 K/UL (ref 0–0.4)
EOSINOPHIL NFR BLD: 1 % (ref 0–7)
ERYTHROCYTE [DISTWIDTH] IN BLOOD BY AUTOMATED COUNT: 14.3 % (ref 11.5–14.5)
GLOBULIN SER CALC-MCNC: 3.4 G/DL (ref 2–4)
GLUCOSE BLD STRIP.AUTO-MCNC: 142 MG/DL (ref 65–117)
GLUCOSE SERPL-MCNC: 143 MG/DL (ref 65–100)
GLUCOSE UR STRIP.AUTO-MCNC: NEGATIVE MG/DL
HCT VFR BLD AUTO: 46.5 % (ref 35–47)
HGB BLD-MCNC: 14.5 G/DL (ref 11.5–16)
HGB UR QL STRIP: NEGATIVE
IMM GRANULOCYTES # BLD AUTO: 0 K/UL (ref 0–0.04)
IMM GRANULOCYTES NFR BLD AUTO: 0 % (ref 0–0.5)
INR PPP: 1 (ref 0.9–1.1)
KETONES UR QL STRIP.AUTO: NEGATIVE MG/DL
LEUKOCYTE ESTERASE UR QL STRIP.AUTO: ABNORMAL
LYMPHOCYTES # BLD: 2 K/UL (ref 0.8–3.5)
LYMPHOCYTES NFR BLD: 20 % (ref 12–49)
MCH RBC QN AUTO: 28.5 PG (ref 26–34)
MCHC RBC AUTO-ENTMCNC: 31.2 G/DL (ref 30–36.5)
MCV RBC AUTO: 91.4 FL (ref 80–99)
MONOCYTES # BLD: 0.6 K/UL (ref 0–1)
MONOCYTES NFR BLD: 6 % (ref 5–13)
NEUTS SEG # BLD: 7.2 K/UL (ref 1.8–8)
NEUTS SEG NFR BLD: 73 % (ref 32–75)
NITRITE UR QL STRIP.AUTO: NEGATIVE
NRBC # BLD: 0 K/UL (ref 0–0.01)
NRBC BLD-RTO: 0 PER 100 WBC
PERFORMED BY, TECHID: ABNORMAL
PH UR STRIP: 8 [PH] (ref 5–8)
PLATELET # BLD AUTO: 123 K/UL (ref 150–400)
PMV BLD AUTO: 12.9 FL (ref 8.9–12.9)
POTASSIUM SERPL-SCNC: 3.6 MMOL/L (ref 3.5–5.1)
PROT SERPL-MCNC: 7.2 G/DL (ref 6.4–8.2)
PROT UR STRIP-MCNC: NEGATIVE MG/DL
PROTHROMBIN TIME: 13.4 SEC (ref 11.9–14.7)
RBC # BLD AUTO: 5.09 M/UL (ref 3.8–5.2)
RBC #/AREA URNS HPF: ABNORMAL /HPF (ref 0–5)
SODIUM SERPL-SCNC: 141 MMOL/L (ref 136–145)
SP GR UR REFRACTOMETRY: 1.01 (ref 1–1.03)
THERAPEUTIC RANGE,PTTT: NORMAL SEC (ref 82–109)
TROPONIN-HIGH SENSITIVITY: 17 NG/L (ref 0–51)
UROBILINOGEN UR QL STRIP.AUTO: 0.1 EU/DL (ref 0.1–1)
WBC # BLD AUTO: 9.9 K/UL (ref 3.6–11)
WBC URNS QL MICRO: ABNORMAL /HPF (ref 0–4)

## 2021-11-02 PROCEDURE — 85025 COMPLETE CBC W/AUTO DIFF WBC: CPT

## 2021-11-02 PROCEDURE — 93005 ELECTROCARDIOGRAM TRACING: CPT

## 2021-11-02 PROCEDURE — G0378 HOSPITAL OBSERVATION PER HR: HCPCS

## 2021-11-02 PROCEDURE — 94761 N-INVAS EAR/PLS OXIMETRY MLT: CPT

## 2021-11-02 PROCEDURE — 70450 CT HEAD/BRAIN W/O DYE: CPT

## 2021-11-02 PROCEDURE — 83036 HEMOGLOBIN GLYCOSYLATED A1C: CPT

## 2021-11-02 PROCEDURE — 65270000029 HC RM PRIVATE

## 2021-11-02 PROCEDURE — 81001 URINALYSIS AUTO W/SCOPE: CPT

## 2021-11-02 PROCEDURE — 99285 EMERGENCY DEPT VISIT HI MDM: CPT

## 2021-11-02 PROCEDURE — 84484 ASSAY OF TROPONIN QUANT: CPT

## 2021-11-02 PROCEDURE — 36415 COLL VENOUS BLD VENIPUNCTURE: CPT

## 2021-11-02 PROCEDURE — 85610 PROTHROMBIN TIME: CPT

## 2021-11-02 PROCEDURE — 82962 GLUCOSE BLOOD TEST: CPT

## 2021-11-02 PROCEDURE — 80061 LIPID PANEL: CPT

## 2021-11-02 PROCEDURE — 85730 THROMBOPLASTIN TIME PARTIAL: CPT

## 2021-11-02 PROCEDURE — 80053 COMPREHEN METABOLIC PANEL: CPT

## 2021-11-02 PROCEDURE — 74011250637 HC RX REV CODE- 250/637: Performed by: FAMILY MEDICINE

## 2021-11-02 RX ORDER — LOSARTAN POTASSIUM 100 MG/1
100 TABLET ORAL DAILY
COMMUNITY

## 2021-11-02 RX ORDER — ACETAMINOPHEN 650 MG/1
650 SUPPOSITORY RECTAL
Status: DISCONTINUED | OUTPATIENT
Start: 2021-11-02 | End: 2021-11-04 | Stop reason: HOSPADM

## 2021-11-02 RX ORDER — ASPIRIN 81 MG/1
81 TABLET ORAL DAILY
COMMUNITY

## 2021-11-02 RX ORDER — ATORVASTATIN CALCIUM 20 MG/1
20 TABLET, FILM COATED ORAL DAILY
Status: DISCONTINUED | OUTPATIENT
Start: 2021-11-03 | End: 2021-11-03

## 2021-11-02 RX ORDER — ACETAMINOPHEN 325 MG/1
650 TABLET ORAL
Status: DISCONTINUED | OUTPATIENT
Start: 2021-11-02 | End: 2021-11-04 | Stop reason: HOSPADM

## 2021-11-02 RX ORDER — MAGNESIUM SULFATE 100 %
4 CRYSTALS MISCELLANEOUS AS NEEDED
Status: DISCONTINUED | OUTPATIENT
Start: 2021-11-02 | End: 2021-11-04 | Stop reason: HOSPADM

## 2021-11-02 RX ORDER — HYDRALAZINE HYDROCHLORIDE 10 MG/1
10 TABLET, FILM COATED ORAL 3 TIMES DAILY
COMMUNITY

## 2021-11-02 RX ORDER — INSULIN LISPRO 100 [IU]/ML
INJECTION, SOLUTION INTRAVENOUS; SUBCUTANEOUS
Status: DISCONTINUED | OUTPATIENT
Start: 2021-11-02 | End: 2021-11-04 | Stop reason: HOSPADM

## 2021-11-02 RX ORDER — HYDRALAZINE HYDROCHLORIDE 10 MG/1
10 TABLET, FILM COATED ORAL 3 TIMES DAILY
Status: DISCONTINUED | OUTPATIENT
Start: 2021-11-02 | End: 2021-11-04 | Stop reason: HOSPADM

## 2021-11-02 RX ORDER — CHOLECALCIFEROL TAB 125 MCG (5000 UNIT) 125 MCG
5000 TAB ORAL DAILY
COMMUNITY

## 2021-11-02 RX ORDER — MONTELUKAST SODIUM 10 MG/1
10 TABLET ORAL DAILY
COMMUNITY

## 2021-11-02 RX ORDER — CHOLECALCIFEROL TAB 125 MCG (5000 UNIT) 125 MCG
5000 TAB ORAL DAILY
Status: DISCONTINUED | OUTPATIENT
Start: 2021-11-03 | End: 2021-11-04 | Stop reason: HOSPADM

## 2021-11-02 RX ORDER — DEXTROSE 50 % IN WATER (D50W) INTRAVENOUS SYRINGE
25-50 AS NEEDED
Status: DISCONTINUED | OUTPATIENT
Start: 2021-11-02 | End: 2021-11-04 | Stop reason: HOSPADM

## 2021-11-02 RX ORDER — ONDANSETRON 2 MG/ML
4 INJECTION INTRAMUSCULAR; INTRAVENOUS
Status: DISCONTINUED | OUTPATIENT
Start: 2021-11-02 | End: 2021-11-04 | Stop reason: HOSPADM

## 2021-11-02 RX ORDER — SODIUM CHLORIDE 9 MG/ML
25 INJECTION, SOLUTION INTRAVENOUS CONTINUOUS
Status: DISCONTINUED | OUTPATIENT
Start: 2021-11-02 | End: 2021-11-04 | Stop reason: HOSPADM

## 2021-11-02 RX ORDER — MONTELUKAST SODIUM 10 MG/1
10 TABLET ORAL DAILY
Status: DISCONTINUED | OUTPATIENT
Start: 2021-11-03 | End: 2021-11-04 | Stop reason: HOSPADM

## 2021-11-02 RX ORDER — BUSPIRONE HYDROCHLORIDE 10 MG/1
10 TABLET ORAL 3 TIMES DAILY
COMMUNITY

## 2021-11-02 RX ORDER — LOSARTAN POTASSIUM 50 MG/1
100 TABLET ORAL DAILY
Status: DISCONTINUED | OUTPATIENT
Start: 2021-11-03 | End: 2021-11-04 | Stop reason: HOSPADM

## 2021-11-02 RX ORDER — POLYETHYLENE GLYCOL 3350 17 G/17G
17 POWDER, FOR SOLUTION ORAL DAILY PRN
Status: DISCONTINUED | OUTPATIENT
Start: 2021-11-02 | End: 2021-11-04 | Stop reason: HOSPADM

## 2021-11-02 RX ORDER — AMLODIPINE BESYLATE 10 MG/1
10 TABLET ORAL DAILY
COMMUNITY

## 2021-11-02 RX ORDER — AMLODIPINE BESYLATE 5 MG/1
10 TABLET ORAL DAILY
Status: DISCONTINUED | OUTPATIENT
Start: 2021-11-03 | End: 2021-11-04 | Stop reason: HOSPADM

## 2021-11-02 RX ORDER — ASPIRIN 81 MG/1
81 TABLET ORAL DAILY
Status: DISCONTINUED | OUTPATIENT
Start: 2021-11-03 | End: 2021-11-03

## 2021-11-02 RX ORDER — ATORVASTATIN CALCIUM 20 MG/1
20 TABLET, FILM COATED ORAL DAILY
COMMUNITY

## 2021-11-02 RX ORDER — ENOXAPARIN SODIUM 100 MG/ML
40 INJECTION SUBCUTANEOUS DAILY
Status: DISCONTINUED | OUTPATIENT
Start: 2021-11-03 | End: 2021-11-04 | Stop reason: HOSPADM

## 2021-11-02 RX ORDER — ONDANSETRON 4 MG/1
4 TABLET, ORALLY DISINTEGRATING ORAL
Status: DISCONTINUED | OUTPATIENT
Start: 2021-11-02 | End: 2021-11-04 | Stop reason: HOSPADM

## 2021-11-02 RX ORDER — BUSPIRONE HYDROCHLORIDE 10 MG/1
10 TABLET ORAL 3 TIMES DAILY
Status: DISCONTINUED | OUTPATIENT
Start: 2021-11-02 | End: 2021-11-04 | Stop reason: HOSPADM

## 2021-11-02 RX ADMIN — HYDRALAZINE HYDROCHLORIDE 10 MG: 10 TABLET, FILM COATED ORAL at 21:23

## 2021-11-02 RX ADMIN — BUSPIRONE HYDROCHLORIDE 10 MG: 10 TABLET ORAL at 21:23

## 2021-11-02 NOTE — H&P
History and Physical    NAME: Elysia Poon   :  1960   MRN:  [id number]     Date/Time:  2021 6:21 PM    Patient PCP: MELY Key  ______________________________________________________________________             Subjective:     CHIEF COMPLAINT:     Slurred speech    HISTORY OF PRESENT ILLNESS:       Patient is a 64y.o. year old female with past medical history of hypertension diabetes congestive heart failure obstructive sleep apnea came to emergency room because of slurred speech patient was talking to her landlord to pay her rent early this morning and the landlord noticed she have trouble speaking the patient was sent to the ER seen by the ER physician CT scan of the head done shows old stroke patient was recommend to be admitted for further evaluation and treatment    Past Medical History:   Diagnosis Date    Asthma     Chronic obstructive pulmonary disease (HCC)     Congestive heart failure, unspecified     Diabetes ([address]     High cholesterol     Morbid obesity ([facility] [address]     Sleep apnea         Past Surgical History:   Procedure Laterality Date    HX GYN      partial hysterectomy       Social History     Tobacco Use    Smoking status: Former Smoker    Smokeless tobacco: Never Used   Substance Use Topics    Alcohol use: Never     Comment: socially        Family History   Problem Relation Age of Onset    Cancer Mother     Breast Cancer Sister        No Known Allergies     Prior to Admission medications    Medication Sig Start Date End Date Taking? Authorizing Provider   hydrALAZINE (APRESOLINE) 10 mg tablet Take 10 mg by mouth three (3) times daily. Yes Vicente, MD Margareth   busPIRone (BUSPAR) 10 mg tablet Take 10 mg by mouth three (3) times daily. Yes Other, MD Margareth   losartan (COZAAR) 100 mg tablet Take 100 mg by mouth daily. Yes Vicente, MD Margareth   aspirin delayed-release 81 mg tablet Take 81 mg by mouth daily.    Yes Vicente, MD Margareth   amLODIPine (NORVASC) 10 mg tablet Take 10 mg by mouth daily. Yes Other, MD Margareth   montelukast (SINGULAIR) 10 mg tablet Take 10 mg by mouth daily. Yes Vicente, MD Margareth   atorvastatin (LIPITOR) 20 mg tablet Take 20 mg by mouth daily. Yes Vicente, MD Margareth   cholecalciferol (VITAMIN D3) (5000 Units/125 mcg) tab tablet Take 5,000 Units by mouth daily.    Yes Other, MD Margareth         Current Facility-Administered Medications:     hydrALAZINE (APRESOLINE) tablet 10 mg, 10 mg, Oral, TID, Winter Bass MD    busPIRone (BUSPAR) tablet 10 mg, 10 mg, Oral, TID, Winter Bass MD  [employer]  [START ON 11/3/2021] losartan (COZAAR) tablet 100 mg, 100 mg, Oral, DAILY, Artur Anderson MD  [employer]  [START ON 11/3/2021] aspirin delayed-release tablet 81 mg, 81 mg, Oral, DAILY, Winter Bass MD  [employer]  [START ON 11/3/2021] amLODIPine (NORVASC) tablet 10 mg, 10 mg, Oral, DAILY, Artur Anderson MD  [employer]  [START ON 11/3/2021] montelukast (SINGULAIR) tablet 10 mg, 10 mg, Oral, DAILY, Artur Anderson MD  [employer]  [START ON 11/3/2021] atorvastatin (LIPITOR) tablet 20 mg, 20 mg, Oral, DAILY, Artur Anderson MD  [employer]  [START ON 11/3/2021] cholecalciferol (VITAMIN D3) (5000 Units/125 mcg) tablet 5,000 Units, 5,000 Units, Oral, DAILY, Winter Bass MD    insulin lispro (HUMALOG) injection, , SubCUTAneous, AC&HS, Winter Bass MD    glucose chewable tablet 16 g, 4 Tablet, Oral, PRN, Winter Bass MD    dextrose (D50W) injection syrg 12.5-25 g, 25-50 mL, IntraVENous, PRN, Winter Bass MD    glucagon (GLUCAGEN) injection 1 mg, 1 mg, IntraMUSCular, PRN, Winter Bass MD    0.9% sodium chloride infusion, 25 mL/hr, IntraVENous, CONTINUOUS, Winter Bass MD    acetaminophen (TYLENOL) tablet 650 mg, 650 mg, Oral, Q6H PRN **OR** acetaminophen (TYLENOL) suppository 650 mg, 650 mg, Rectal, Q6H PRN, Dima Bass MD    polyethylene glycol (MIRALAX) packet 17 g, 17 g, Oral, DAILY PRN, Dima Bass MD    ondansetron (ZOFRAN ODT) tablet 4 mg, 4 mg, Oral, Q8H PRN **OR** ondansetron (ZOFRAN) injection 4 mg, 4 mg, IntraVENous, Q6H PRN, Dima Bass MD    [START ON 11/3/2021] enoxaparin (LOVENOX) injection 40 mg, 40 mg, SubCUTAneous, DAILY, Dima Bass MD    Current Outpatient Medications:     hydrALAZINE (APRESOLINE) 10 mg tablet, Take 10 mg by mouth three (3) times daily. , Disp: , Rfl:     busPIRone (BUSPAR) 10 mg tablet, Take 10 mg by mouth three (3) times daily. , Disp: , Rfl:     losartan (COZAAR) 100 mg tablet, Take 100 mg by mouth daily. , Disp: , Rfl:     aspirin delayed-release 81 mg tablet, Take 81 mg by mouth daily. , Disp: , Rfl:     amLODIPine (NORVASC) 10 mg tablet, Take 10 mg by mouth daily. , Disp: , Rfl:     montelukast (SINGULAIR) 10 mg tablet, Take 10 mg by mouth daily. , Disp: , Rfl:     atorvastatin (LIPITOR) 20 mg tablet, Take 20 mg by mouth daily. , Disp: , Rfl:     cholecalciferol (VITAMIN D3) (5000 Units/125 mcg) tab tablet, Take 5,000 Units by mouth daily. , Disp: , Rfl:     LAB DATA REVIEWED:    Recent Results (from the past 24 hour(s))   CBC WITH AUTOMATED DIFF    Collection Time: 11/02/21  1:35 PM   Result Value Ref Range    WBC 9.9 3.6 - 11.0 K/uL    RBC 5.09 3.80 - 5.20 M/uL    HGB 14.5 11.5 - 16.0 g/dL    HCT 46.5 35.0 - 47.0 %    MCV 91.4 80.0 - 99.0 FL    MCH 28.5 26.0 - 34.0 PG    MCHC 31.2 30.0 - 36.5 g/dL    RDW 14.3 11.5 - 14.5 %    PLATELET 448 (L) 934 - 400 K/uL    MPV 12.9 8.9 - 12.9 FL    NRBC 0.0 0.0  WBC    ABSOLUTE NRBC 0.00 0.00 - 0.01 K/uL    NEUTROPHILS 73 32 - 75 %    LYMPHOCYTES 20 12 - 49 %    MONOCYTES 6 5 - 13 %    EOSINOPHILS 1 0 - 7 %    BASOPHILS 0 0 - 1 %    IMMATURE GRANULOCYTES 0 0 - 0.5 %    ABS. NEUTROPHILS 7.2 1.8 - 8.0 K/UL    ABS. LYMPHOCYTES 2.0 0.8 - 3.5 K/UL    ABS. MONOCYTES 0.6 0.0 - 1.0 K/UL    ABS. EOSINOPHILS 0.1 0.0 - 0.4 K/UL    ABS. BASOPHILS 0.0 0.0 - 0.1 K/UL    ABS. IMM.  GRANS. 0.0 0.00 - 0.04 K/UL    DF AUTOMATED     METABOLIC PANEL, COMPREHENSIVE    Collection Time: 11/02/21 1:35 PM   Result Value Ref Range    Sodium 141 136 - 145 mmol/L    Potassium 3.6 3.5 - 5.1 mmol/L    Chloride 110 (H) 97 - 108 mmol/L    CO2 26 21 - 32 mmol/L    Anion gap 5 5 - 15 mmol/L    Glucose 143 (H) 65 - 100 mg/dL    BUN 13 6 - 20 mg/dL    Creatinine 0.66 0.55 - 1.02 mg/dL    BUN/Creatinine ratio 20 12 - 20      GFR est AA >60 >60 ml/min/1.73m2    GFR est non-AA >60 >60 ml/min/1.73m2    Calcium 9.5 8.5 - 10.1 mg/dL    Bilirubin, total 0.8 0.2 - 1.0 mg/dL    AST (SGOT) 16 15 - 37 U/L    ALT (SGPT) 23 12 - 78 U/L    Alk. phosphatase 77 45 - 117 U/L    Protein, total 7.2 6.4 - 8.2 g/dL    Albumin 3.8 3.5 - 5.0 g/dL    Globulin 3.4 2.0 - 4.0 g/dL    A-G Ratio 1.1 1.1 - 2.2     PROTHROMBIN TIME + INR    Collection Time: 11/02/21  1:35 PM   Result Value Ref Range    Prothrombin time 13.4 11.9 - 14.7 sec    INR 1.0 0.9 - 1.1     PTT    Collection Time: 11/02/21  1:35 PM   Result Value Ref Range    aPTT 23.9 21.2 - 34.1 sec    aPTT, therapeutic range   82 - 109 sec   TROPONIN-HIGH SENSITIVITY    Collection Time: 11/02/21  1:35 PM   Result Value Ref Range    Troponin-High Sensitivity 17 0 - 51 ng/L   URINALYSIS W/MICROSCOPIC    Collection Time: 11/02/21  4:45 PM   Result Value Ref Range    Color Yellow/Straw      Appearance Turbid (A) Clear      Specific gravity 1.008 1.003 - 1.030      pH (UA) 8.0 5.0 - 8.0      Protein Negative Negative mg/dL    Glucose Negative Negative mg/dL    Ketone Negative Negative mg/dL    Bilirubin Negative Negative      Blood Negative Negative      Urobilinogen 0.1 0.1 - 1.0 EU/dL    Nitrites Negative Negative      Leukocyte Esterase Small (A) Negative      WBC 0-4 0 - 4 /hpf    RBC 0-5 0 - 5 /hpf    Bacteria Negative Negative /hpf       XR Results (most recent):  No results found for this or any previous visit. CT CODE NEURO HEAD WO CONTRAST   Final Result   Old stroke. No acute findings           Review of Systems:  Constitutional: Negative for chills and fever. HENT: Negative. Eyes: Negative. Respiratory: Negative. Cardiovascular: Negative. Gastrointestinal: Negative for abdominal pain and nausea. Skin: Negative. Neurological: Negative. Objective:   VITALS:    Visit Vitals  /73   Pulse 72   Temp 97.9 °F (36.6 °C)   Resp 16   Ht 5' 2\" (1.575 m)   Wt 113.4 kg (250 lb)   SpO2 100%   BMI 45.73 kg/m²       Physical Exam:   Constitutional: pt is oriented to person, place, and time. HENT:   Head: Normocephalic and atraumatic. Eyes: Pupils are equal, round, and reactive to light. EOM are normal.   Cardiovascular: Normal rate, regular rhythm and normal heart sounds. Pulmonary/Chest: Breath sounds normal. No wheezes. No rales. Exhibits no tenderness. Abdominal: Soft. Bowel sounds are normal. There is no abdominal tenderness. There is no rebound and no guarding. Musculoskeletal: Normal range of motion. Neurological: pt is alert and oriented to person, place, and time. Alert. Normal strength. No cranial nerve deficit or sensory deficit. Displays a negative Romberg sign.         ASSESSMENT & PLAN:    TIA  Diabetes mellitus  Hypertension  CHF  Operative sleep apnea      Current Facility-Administered Medications:     hydrALAZINE (APRESOLINE) tablet 10 mg, 10 mg, Oral, TID, Samantha Bass MD    busPIRone (BUSPAR) tablet 10 mg, 10 mg, Oral, TID, Samantha Bass MD [place]  [START ON 11/3/2021] losartan (COZAAR) tablet 100 mg, 100 mg, Oral, DAILY, Samantha Bass MD [place]  [START ON 11/3/2021] aspirin delayed-release tablet 81 mg, 81 mg, Oral, DAILY, Samantha Bass MD [place]  [START ON 11/3/2021] amLODIPine (NORVASC) tablet 10 mg, 10 mg, Oral, DAILY, Dima Bass MD [place]  [START ON 11/3/2021] montelukast (SINGULAIR) tablet 10 mg, 10 mg, Oral, DAILY, Dima aBss MD [place]  [START ON 11/3/2021] atorvastatin (LIPITOR) tablet 20 mg, 20 mg, Oral, DAILY, Dima Bass MD    [START ON 11/3/2021] cholecalciferol (VITAMIN D3) (5000 Units/125 mcg) tablet 5,000 Units, 5,000 Units, Oral, DAILY, Michelle Bass MD    insulin lispro (HUMALOG) injection, , SubCUTAneous, AC&HS, Michelle Bass MD    glucose chewable tablet 16 g, 4 Tablet, Oral, PRN, Michelle Bass MD    dextrose (D50W) injection syrg 12.5-25 g, 25-50 mL, IntraVENous, PRN, Michelle Bass MD    glucagon [facility]) injection 1 mg, 1 mg, IntraMUSCular, PRN, Michelle Bass MD    0.9% sodium chloride infusion, 25 mL/hr, IntraVENous, CONTINUOUS, Michelle Bass MD    acetaminophen (TYLENOL) tablet 650 mg, 650 mg, Oral, Q6H PRN **OR** acetaminophen (TYLENOL) suppository 650 mg, 650 mg, Rectal, Q6H PRN, Michelle Bass MD    polyethylene glycol (MIRALAX) packet 17 g, 17 g, Oral, DAILY PRN, Michelle Bass MD    ondansetron (ZOFRAN ODT) tablet 4 mg, 4 mg, Oral, Q8H PRN **OR** ondansetron (ZOFRAN) injection 4 mg, 4 mg, IntraVENous, Q6H PRN, Dima Bass MD  [facility]  [START ON 11/3/2021] enoxaparin (LOVENOX) injection 40 mg, 40 mg, SubCUTAneous, DAILY, Dima Bass MD    Current Outpatient Medications:     hydrALAZINE (APRESOLINE) 10 mg tablet, Take 10 mg by mouth three (3) times daily. , Disp: , Rfl:     busPIRone (BUSPAR) 10 mg tablet, Take 10 mg by mouth three (3) times daily. , Disp: , Rfl:     losartan (COZAAR) 100 mg tablet, Take 100 mg by mouth daily. , Disp: , Rfl:     aspirin delayed-release 81 mg tablet, Take 81 mg by mouth daily. , Disp: , Rfl:     amLODIPine (NORVASC) 10 mg tablet, Take 10 mg by mouth daily. , Disp: , Rfl:     montelukast (SINGULAIR) 10 mg tablet, Take 10 mg by mouth daily. , Disp: , Rfl:     atorvastatin (LIPITOR) 20 mg tablet, Take 20 mg by mouth daily. , Disp: , Rfl:     cholecalciferol (VITAMIN D3) (5000 Units/125 mcg) tab tablet, Take 5,000 Units by mouth daily. , Disp: , Rfl:   ________________________________________________________________________    Signed: Rima Leonard MD

## 2021-11-02 NOTE — CONSULTS
Neurology Consult Note    HPI  Ms. Roel Moreno is a 64 y.o.  female with a history significant for HTN, HL, DM2, CHF, COPD, Asthma, ANTONI who presented with speech difficulties and facial droop. Per chart review, patient was talking to her landlord when she began have difficulty getting her words out  And was noted to have a facial  Droop. Emergent CT head revealed no acute findings but did reveal remote ischemia in the left posterior parietal region. Patient was not deemed a candidate for tPA or neuro intervention as patient's does resolved while in the ED. Home Stroke Prophylaxis: ASA 81, Atorvastatin 20    Patient corroborates the above story. She reports no known previous stroke. She ambulates without assistive device at home. Has no current complaints. IMPRESSION  Ms. Roel Moreno is a 64 y.o.  female with the above history presented with  Episodic facial droop and difficulty getting her words out. Emergent CT head was negative for any acute findings. Patient was not deemed a candidate for tPA neuro intervention as patient's symptoms had all resolved while in the ED. Reviewed patient's CT personally which reveals left lateral parietal ischemia which is likely remote. Etiology patient's presenting symptoms likely recrudescence of ischemic symptoms but TIA cannot be completely ruled out. Will obtain further neuroimaging and stroke workup per below. Will increase patient's antiplatelet therapy and statin therapy per below as well. Will make further recommendations based on workup.       RECOMMENDATIONS      Episodic Facial Droop, Speech Difficulties, Likely Related To Recrudescence of Left Parietal Ischemia vs TIA   -Q6Hr NeuroChecks, TELE  -Stroke Prophylaxis: , Atorvastatin 40   -SBP Goal < 160  -Glucose Goal < 180  -Head of Bed at 30 degrees  -VTE Prophylaxis: Enoxaparin 40 daily  -PT/OT/ST as needed  -Management of metabolic/infectious derangements to referring teams  -F/U MRI Brain WO, CUS, TTE, HgbA1c, Lipid Panel      Review of Systems  A 14 point review was done and pertinent positives & negative findings are listed as part of the history of present illness, all other systems were reviewed and are negative. Physical/Neurological Exam  Cardiovascular: tachycardic at times  Pulmonary: no increased work of breathing  Skin: warm and dry      Patient awake, alert; following central and peripheral commands   No expressive or receptive aphasia;  No dysarthria   Pupils react to light bilaterally; EOM Intact   No visual field deficits on gross exam   No facial droop   Motor: 5-/5 Throughout   No abnormal movements   Sensation to light touch intact grossly throughout     NIHSS: 0      Past Medical History:   Diagnosis Date    Asthma     Chronic obstructive pulmonary disease (HCC)     Congestive heart failure, unspecified     Diabetes ([address]     High cholesterol     Morbid obesity ([address]     Sleep apnea       Past Surgical History:   Procedure Laterality Date    HX GYN      partial hysterectomy     No Known Allergies  Family History   Problem Relation Age of Onset    Cancer Mother     Breast Cancer Sister         Social Connections:     Frequency of Communication with Friends and Family:     Frequency of Social Gatherings with Friends and Family:     Attends Yarsanism Services:     Active Member of Clubs or Organizations:     Attends Club or Organization Meetings:     Marital Status:          Medications  Current Outpatient Medications   Medication Instructions    amLODIPine (NORVASC) 10 mg, Oral, DAILY    aspirin delayed-release 81 mg, Oral, DAILY    atorvastatin (LIPITOR) 20 mg, Oral, DAILY    busPIRone (BUSPAR) 10 mg, Oral, 3 TIMES DAILY    cholecalciferol (VITAMIN D3) 5,000 Units, Oral, DAILY    hydrALAZINE (APRESOLINE) 10 mg, Oral, 3 TIMES DAILY    losartan (COZAAR) 100 mg, Oral, DAILY    montelukast (SINGULAIR) 10 mg, Oral, DAILY       Current Facility-Administered Medications   Medication Dose Route Frequency    hydrALAZINE (APRESOLINE) tablet 10 mg  10 mg Oral TID    busPIRone (BUSPAR) tablet 10 mg  10 mg Oral TID    [START ON 11/3/2021] losartan (COZAAR) tablet 100 mg  100 mg Oral DAILY    [START ON 11/3/2021] aspirin delayed-release tablet 81 mg  81 mg Oral DAILY    [START ON 11/3/2021] amLODIPine (NORVASC) tablet 10 mg  10 mg Oral DAILY    [START ON 11/3/2021] montelukast (SINGULAIR) tablet 10 mg  10 mg Oral DAILY    [START ON 11/3/2021] atorvastatin (LIPITOR) tablet 20 mg  20 mg Oral DAILY    [START ON 11/3/2021] cholecalciferol (VITAMIN D3) (5000 Units/125 mcg) tablet 5,000 Units  5,000 Units Oral DAILY    insulin lispro (HUMALOG) injection   SubCUTAneous AC&HS    glucose chewable tablet 16 g  4 Tablet Oral PRN    dextrose (D50W) injection syrg 12.5-25 g  25-50 mL IntraVENous PRN    glucagon (GLUCAGEN) injection 1 mg  1 mg IntraMUSCular PRN    0.9% sodium chloride infusion  25 mL/hr IntraVENous CONTINUOUS    acetaminophen (TYLENOL) tablet 650 mg  650 mg Oral Q6H PRN    Or    acetaminophen (TYLENOL) suppository 650 mg  650 mg Rectal Q6H PRN    polyethylene glycol (MIRALAX) packet 17 g  17 g Oral DAILY PRN    ondansetron (ZOFRAN ODT) tablet 4 mg  4 mg Oral Q8H PRN    Or    ondansetron (ZOFRAN) injection 4 mg  4 mg IntraVENous Q6H PRN    [START ON 11/3/2021] enoxaparin (LOVENOX) injection 40 mg  40 mg SubCUTAneous DAILY     Current Outpatient Medications   Medication Sig    hydrALAZINE (APRESOLINE) 10 mg tablet Take 10 mg by mouth three (3) times daily.  busPIRone (BUSPAR) 10 mg tablet Take 10 mg by mouth three (3) times daily.  losartan (COZAAR) 100 mg tablet Take 100 mg by mouth daily.  aspirin delayed-release 81 mg tablet Take 81 mg by mouth daily.  amLODIPine (NORVASC) 10 mg tablet Take 10 mg by mouth daily.  montelukast (SINGULAIR) 10 mg tablet Take 10 mg by mouth daily.     atorvastatin (LIPITOR) 20 mg tablet Take 20 mg by mouth daily.  cholecalciferol (VITAMIN D3) (5000 Units/125 mcg) tab tablet Take 5,000 Units by mouth daily. Objective  Temp:  [97.9 °F (36.6 °C)-98.4 °F (36.9 °C)]   Pulse (Heart Rate):  [72-80]   BP: (129-168)/()   Resp Rate:  [16-20]   O2 Sat (%):  [92 %-100 %]   Weight:  [113.4 kg (250 lb)]   No intake or output data in the 24 hours ending 11/02/21 1833  Wt Readings from Last 3 Encounters:   11/02/21 113.4 kg (250 lb)   09/27/20 106.6 kg (235 lb)   09/16/20 104 kg (229 lb 4.5 oz)        Labs  Recent Results (from the past 24 hour(s))   CBC WITH AUTOMATED DIFF    Collection Time: 11/02/21  1:35 PM   Result Value Ref Range    WBC 9.9 3.6 - 11.0 K/uL    RBC 5.09 3.80 - 5.20 M/uL    HGB 14.5 11.5 - 16.0 g/dL    HCT 46.5 35.0 - 47.0 %    MCV 91.4 80.0 - 99.0 FL    MCH 28.5 26.0 - 34.0 PG    MCHC 31.2 30.0 - 36.5 g/dL    RDW 14.3 11.5 - 14.5 %    PLATELET 004 (L) 791 - 400 K/uL    MPV 12.9 8.9 - 12.9 FL    NRBC 0.0 0.0  WBC    ABSOLUTE NRBC 0.00 0.00 - 0.01 K/uL    NEUTROPHILS 73 32 - 75 %    LYMPHOCYTES 20 12 - 49 %    MONOCYTES 6 5 - 13 %    EOSINOPHILS 1 0 - 7 %    BASOPHILS 0 0 - 1 %    IMMATURE GRANULOCYTES 0 0 - 0.5 %    ABS. NEUTROPHILS 7.2 1.8 - 8.0 K/UL    ABS. LYMPHOCYTES 2.0 0.8 - 3.5 K/UL    ABS. MONOCYTES 0.6 0.0 - 1.0 K/UL    ABS. EOSINOPHILS 0.1 0.0 - 0.4 K/UL    ABS. BASOPHILS 0.0 0.0 - 0.1 K/UL    ABS. IMM.  GRANS. 0.0 0.00 - 0.04 K/UL    DF AUTOMATED     METABOLIC PANEL, COMPREHENSIVE    Collection Time: 11/02/21  1:35 PM   Result Value Ref Range    Sodium 141 136 - 145 mmol/L    Potassium 3.6 3.5 - 5.1 mmol/L    Chloride 110 (H) 97 - 108 mmol/L    CO2 26 21 - 32 mmol/L    Anion gap 5 5 - 15 mmol/L    Glucose 143 (H) 65 - 100 mg/dL    BUN 13 6 - 20 mg/dL    Creatinine 0.66 0.55 - 1.02 mg/dL    BUN/Creatinine ratio 20 12 - 20      GFR est AA >60 >60 ml/min/1.73m2    GFR est non-AA >60 >60 ml/min/1.73m2    Calcium 9.5 8.5 - 10.1 mg/dL    Bilirubin, total 0.8 0.2 - 1.0 mg/dL    AST (SGOT) 16 15 - 37 U/L    ALT (SGPT) 23 12 - 78 U/L    Alk. phosphatase 77 45 - 117 U/L    Protein, total 7.2 6.4 - 8.2 g/dL    Albumin 3.8 3.5 - 5.0 g/dL    Globulin 3.4 2.0 - 4.0 g/dL    A-G Ratio 1.1 1.1 - 2.2     PROTHROMBIN TIME + INR    Collection Time: 11/02/21  1:35 PM   Result Value Ref Range    Prothrombin time 13.4 11.9 - 14.7 sec    INR 1.0 0.9 - 1.1     PTT    Collection Time: 11/02/21  1:35 PM   Result Value Ref Range    aPTT 23.9 21.2 - 34.1 sec    aPTT, therapeutic range   82 - 109 sec   TROPONIN-HIGH SENSITIVITY    Collection Time: 11/02/21  1:35 PM   Result Value Ref Range    Troponin-High Sensitivity 17 0 - 51 ng/L   URINALYSIS W/MICROSCOPIC    Collection Time: 11/02/21  4:45 PM   Result Value Ref Range    Color Yellow/Straw      Appearance Turbid (A) Clear      Specific gravity 1.008 1.003 - 1.030      pH (UA) 8.0 5.0 - 8.0      Protein Negative Negative mg/dL    Glucose Negative Negative mg/dL    Ketone Negative Negative mg/dL    Bilirubin Negative Negative      Blood Negative Negative      Urobilinogen 0.1 0.1 - 1.0 EU/dL    Nitrites Negative Negative      Leukocyte Esterase Small (A) Negative      WBC 0-4 0 - 4 /hpf    RBC 0-5 0 - 5 /hpf    Bacteria Negative Negative /hpf          Significant Diagnostic Studies  All images independently visualized    CT CODE NEURO HEAD WO CONTRAST   Final Result   Old stroke. No acute findings      DUPLEX CAROTID BILATERAL    (Results Pending)         This document has been prepared by the Dragon voice recognition system, typographical errors may have occurred.  Attempts have been made to correct errors, however inadvertent errors may persist.

## 2021-11-02 NOTE — PROGRESS NOTES
Spiritual Care Assessment/Progress Note  [facility]      NAME: Conchita Young      MRN: [id number]  AGE: 64 y.o. SEX: female  Amish Affiliation: Worship   Language: English     11/2/2021     Total Time (in minutes): 12     Spiritual Assessment begun in [address] [place] [address] DEPT through conversation with:         [x]Patient        [x] Family    [] Friend(s)        Reason for Consult: Crisis     Spiritual beliefs: (Please include comment if needed)     [] Identifies with a nataly tradition:         [] Supported by a nataly community:            [] Claims no spiritual orientation:           [] Seeking spiritual identity:                [] Adheres to an individual form of spirituality:           [x] Not able to assess:                           Identified resources for coping:      [] Prayer                               [] Music                  [] Guided Imagery     [] Family/friends                 [] Pet visits     [] Devotional reading                         [x] Unknown     [] Other:                                               Interventions offered during this visit: (See comments for more details)    Patient Interventions:  Other (comment), Crisis, Initial visit (Silent support and prayer)     Family/Friend(s): Other (comment) (Silent support and prayer)     Plan of Care:     [] Support spiritual and/or cultural needs    [] Support AMD and/or advance care planning process      [] Support grieving process   [] Coordinate Rites and/or Rituals    [] Coordination with community clergy   [] No spiritual needs identified at this time   [] Detailed Plan of Care below (See Comments)  [] Make referral to Music Therapy  [] Make referral to Pet Therapy     [] Make referral to Addiction services  [] Make referral to [facility]  [] Make referral to Spiritual Care Partner  [] No future visits requested        [x] Follow up upon further referrals     Comments:  Visit attempted for patient in the ED unit for Code stroke. Medical staff were providing care for the patient during the visit. One visitor was in the room. Provided silent support and prayer per patient's nataly tradition. Advised of  availability. Chaplains will follow up if further referrals are requested. Chaplain Ian Hoyos M.Div.    can be reached by calling the  at [facility]  [phone]

## 2021-11-02 NOTE — ROUTINE PROCESS
TRANSFER - OUT REPORT:    Verbal report given to [facility] to [facility] for routine progression of care       Report consisted of patients Situation, Background, Assessment and   Recommendations(SBAR). Information from the following report(s) SBAR was reviewed with the receiving nurse. Lines:   Peripheral IV 11/02/21 Anterior; Left Forearm (Active)        Opportunity for questions and clarification was provided.       Patient transported with:   [employer]

## 2021-11-02 NOTE — ED PROVIDER NOTES
EMERGENCY DEPARTMENT HISTORY AND PHYSICAL EXAM      Date: 11/2/2021  Patient Name: Shirley Hamm      History of Presenting Illness     No chief complaint on file. History Provided By: Patient    HPI: Shirley Hamm, 64 y.o. female with a past medical history significant hypertension presents to the ED with cc of dysarthria. Patient states she was talking to her landlord earlier this morning and he noticed she was having some trouble speaking. Patient states she had not been speaking to anybody earlier in the day, so she is unaware when her symptoms would have started. Patient denies any complaint at this time, denies history of the same. There are no other complaints, changes, or physical findings at this time. PCP: MELY Andrews Se        Past History     Past Medical History:  Past Medical History:   Diagnosis Date    Asthma     Chronic obstructive pulmonary disease ([address]     Congestive heart failure, unspecified     Diabetes ([address]     High cholesterol     Morbid [facility] [facility]     Sleep apnea        Past Surgical History:  Past Surgical History:   Procedure Laterality Date    HX GYN      partial hysterectomy       Family History:  Family History   Problem Relation Age of Onset    Cancer Mother     Breast Cancer Sister        Social History:  Social History     Tobacco Use    Smoking status: Former Smoker    Smokeless tobacco: Never Used   Substance Use Topics    Alcohol use: Never     Comment: socially    Drug use: Never       Allergies:  No Known Allergies      Review of Systems   Review of Systems   Constitutional: Negative for chills and fever. HENT: Negative for sinus pressure and sinus pain. Eyes: Negative for photophobia and redness. Respiratory: Negative for shortness of breath and wheezing. Cardiovascular: Negative for chest pain and palpitations. Gastrointestinal: Negative for abdominal pain and nausea.    Genitourinary: Negative for flank pain and hematuria. Musculoskeletal: Negative for arthralgias and gait problem. Skin: Negative for color change and pallor. Neurological: Negative for dizziness and weakness. Review of Systems    Physical Exam   Physical Exam  Constitutional:       General: She is not in acute distress. Appearance: Normal appearance. She is not toxic-appearing. HENT:      Head: Normocephalic and atraumatic. Nose: Nose normal.      Mouth/Throat:      Mouth: Mucous membranes are moist.   Eyes:      Extraocular Movements: Extraocular movements intact. Pupils: Pupils are equal, round, and reactive to light. Cardiovascular:      Rate and Rhythm: Normal rate. Pulses: Normal pulses. Pulmonary:      Effort: Pulmonary effort is normal.      Breath sounds: No stridor, clear to auscultation bilaterally  Abdominal:      General: Abdomen is flat. There is no distension. Musculoskeletal:         General: Normal range of motion. Cervical back: Normal range of motion and neck supple. Skin:     General: Skin is warm and dry. Capillary Refill: Capillary refill takes less than 2 seconds. Neurological:      General: No focal deficit present. Mental Status: She is alert and oriented to person, place, and time.    Psychiatric:         Mood and Affect: Mood normal.         Behavior: Behavior normal.     Physical Exam    Lab and Diagnostic Study Results     Labs -     Recent Results (from the past 12 hour(s))   METABOLIC PANEL, COMPREHENSIVE    Collection Time: 11/02/21  1:35 PM   Result Value Ref Range    Sodium 141 136 - 145 mmol/L    Potassium 3.6 3.5 - 5.1 mmol/L    Chloride 110 (H) 97 - 108 mmol/L    CO2 26 21 - 32 mmol/L    Anion gap 5 5 - 15 mmol/L    Glucose 143 (H) 65 - 100 mg/dL    BUN 13 6 - 20 mg/dL    Creatinine 0.66 0.55 - 1.02 mg/dL    BUN/Creatinine ratio 20 12 - 20      GFR est AA >60 >60 ml/min/1.73m2    GFR est non-AA >60 >60 ml/min/1.73m2    Calcium 9.5 8.5 - 10.1 mg/dL    Bilirubin, total 0.8 0.2 - 1.0 mg/dL    AST (SGOT) 16 15 - 37 U/L    ALT (SGPT) 23 12 - 78 U/L    Alk. phosphatase 77 45 - 117 U/L    Protein, total 7.2 6.4 - 8.2 g/dL    Albumin 3.8 3.5 - 5.0 g/dL    Globulin 3.4 2.0 - 4.0 g/dL    A-G Ratio 1.1 1.1 - 2.2     PROTHROMBIN TIME + INR    Collection Time: 11/02/21  1:35 PM   Result Value Ref Range    Prothrombin time 13.4 11.9 - 14.7 sec    INR 1.0 0.9 - 1.1     PTT    Collection Time: 11/02/21  1:35 PM   Result Value Ref Range    aPTT 23.9 21.2 - 34.1 sec    aPTT, therapeutic range   82 - 109 sec   TROPONIN-HIGH SENSITIVITY    Collection Time: 11/02/21  1:35 PM   Result Value Ref Range    Troponin-High Sensitivity 17 0 - 51 ng/L       Radiologic Studies -   [unfilled]  CT Results  (Last 48 hours)               11/02/21 1347  CT CODE NEURO HEAD WO CONTRAST Final result    Impression:  Old stroke. No acute findings       Narrative:  CT dose reduction was achieved through use of a standardized protocol tailored   for this examination and automatic exposure control for dose modulation. Noncontrast study shows chronic infarct in the left posterior parietal plus   minus occipital lobes, MCA territory, incompletely penetrated, centered on   subcortical white matter and of CSF density. No acute abnormality seen   gray-white matter. No mass effect or hemorrhage. Ventricles are symmetric and   normal in size. There is no significant bone finding. I made multiple calls to   the emergency department and was unable to give this report               CXR Results  (Last 48 hours)    None          Medical Decision Making and ED Course   - I am the first and primary provider for this patient AND AM THE PRIMARY PROVIDER OF RECORD. - I reviewed the vital signs, available nursing notes, past medical history, past surgical history, family history and social history. - Initial assessment performed.  The patients presenting problems have been discussed, and the staff are in agreement with the care plan formulated and outlined with them. I have encouraged them to ask questions as they arise throughout their visit. Vital Signs-Reviewed the patient's vital signs. Patient Vitals for the past 12 hrs:   Temp Pulse Resp BP SpO2   11/02/21 1348 -- 74 20 (!) 166/104 92 %   11/02/21 1334 -- -- -- -- 93 %   11/02/21 1327 -- 80 20 (!) 168/119 92 %   11/02/21 1313 98.4 °F (36.9 °C) 80 16 (!) 158/73 96 %           Records Reviewed: Nursing Notes    The patient presents with     ED Course:       ED Course as of Nov 02 1529   Tue Nov 02, 2021   1439 Discussed findings with patient and family member present regarding likely old stroke, plan for admission for continued treatment and monitoring.    [CS]      ED Course User Index  [CS] Nicci Sanchez MD         Provider Notes (Medical Decision Making): MDM           Consultations:       Consultations: - Tele-Neurology Consultant:   : We have asked for emergent assistance with regard to this patient. We have discussed the acute and any chronic neurologic presentations of this patient with our Neurologist partner as well as the findings on the imaging and labs studies available thus far. They are recommending 24-hour admission for MRI, additional work-up for TIA. Given patient's findings on CT with an apparent prior stroke. Procedures and Critical Care       Performed by: Erasmo Fitzgerald MD  PROCEDURES:  Procedures             Disposition     Disposition: Admitted to Floor Medical Floor the case was discussed with the admitting physician. Discussed patient with primary care Dr. Beatriz Maya at [id number]. Accepts admission          Diagnosis     Clinical Impression:   1. Dysarthria    2. TIA (transient ischemic attack)        Attestations:    Erasmo Fitzgerald MD    Please note that this dictation was completed with [employer], the [employer] voice recognition software.   Quite often unanticipated grammatical, syntax, homophones, and other interpretive errors are inadvertently transcribed by the computer software. Please disregard these errors. Please excuse any errors that have escaped final proofreading. Thank you.

## 2021-11-02 NOTE — ED TRIAGE NOTES
Patient states that she had an \"episode\" when she was paying her rent around 11am she felt like her pressure was up and she said that the people at the rent office said she had a facial droop and and slurred speech, her sister is here with her now who says she is normal and at her baseline symptoms have resolved.

## 2021-11-03 ENCOUNTER — APPOINTMENT (OUTPATIENT)
Dept: NON INVASIVE DIAGNOSTICS | Age: 61
End: 2021-11-03
Attending: FAMILY MEDICINE
Payer: MEDICARE

## 2021-11-03 ENCOUNTER — APPOINTMENT (OUTPATIENT)
Dept: MRI IMAGING | Age: 61
End: 2021-11-03
Attending: STUDENT IN AN ORGANIZED HEALTH CARE EDUCATION/TRAINING PROGRAM
Payer: MEDICARE

## 2021-11-03 LAB
ALBUMIN SERPL-MCNC: 3.1 G/DL (ref 3.5–5)
ALBUMIN/GLOB SERPL: 1 {RATIO} (ref 1.1–2.2)
ALP SERPL-CCNC: 67 U/L (ref 45–117)
ALT SERPL-CCNC: 20 U/L (ref 12–78)
ANION GAP SERPL CALC-SCNC: 4 MMOL/L (ref 5–15)
AST SERPL W P-5'-P-CCNC: 15 U/L (ref 15–37)
ATRIAL RATE: 80 BPM
BASOPHILS # BLD: 0 K/UL (ref 0–0.1)
BASOPHILS NFR BLD: 1 % (ref 0–1)
BILIRUB SERPL-MCNC: 0.5 MG/DL (ref 0.2–1)
BUN SERPL-MCNC: 11 MG/DL (ref 6–20)
BUN/CREAT SERPL: 20 (ref 12–20)
CA-I BLD-MCNC: 8.8 MG/DL (ref 8.5–10.1)
CALCULATED P AXIS, ECG09: 150 DEGREES
CALCULATED R AXIS, ECG10: -13 DEGREES
CALCULATED T AXIS, ECG11: 133 DEGREES
CHLORIDE SERPL-SCNC: 110 MMOL/L (ref 97–108)
CHOLEST SERPL-MCNC: 154 MG/DL
CO2 SERPL-SCNC: 29 MMOL/L (ref 21–32)
CREAT SERPL-MCNC: 0.55 MG/DL (ref 0.55–1.02)
DIAGNOSIS, 93000: NORMAL
DIFFERENTIAL METHOD BLD: NORMAL
ECHO AO ASC DIAM: 2.7 CM
ECHO AO ROOT DIAM: 3 CM
ECHO AV PEAK GRADIENT: 6 MMHG
ECHO AV PEAK VELOCITY: 124 CM/S
ECHO EST RA PRESSURE: 8 MMHG
ECHO LA AREA 4C: 17.4 CM2
ECHO LA MAJOR AXIS: 3.8 CM
ECHO LA MINOR AXIS: 1.81 CM
ECHO LV E' SEPTAL VELOCITY: 5.95 CM/S
ECHO LV EDV A2C: 143 CM3
ECHO LV EDV A4C: 119 CM3
ECHO LV EJECTION FRACTION A4C: 64 %
ECHO LV EJECTION FRACTION BIPLANE: 60.9 % (ref 55–100)
ECHO LV ESV A2C: 43.2 CM3
ECHO LV ESV A4C: 43 CM3
ECHO LV INTERNAL DIMENSION DIASTOLIC: 5.23 CM (ref 3.9–5.3)
ECHO LV INTERNAL DIMENSION SYSTOLIC: 3.51 CM
ECHO LV IVSD: 0.86 CM (ref 0.6–0.9)
ECHO LV MASS 2D: 132.9 G (ref 67–162)
ECHO LV MASS INDEX 2D: 63.3 G/M2 (ref 43–95)
ECHO LV POSTERIOR WALL DIASTOLIC: 0.62 CM (ref 0.6–0.9)
ECHO LVOT PEAK GRADIENT: 4 MMHG
ECHO LVOT PEAK VELOCITY: 106 CM/S
ECHO MV A VELOCITY: 75.6 CM/S
ECHO MV AREA PHT: 1.29 CM2
ECHO MV E DECELERATION TIME (DT): 264 MS
ECHO MV E VELOCITY: 57.1 CM/S
ECHO MV E/A RATIO: 0.76
ECHO MV E/E' SEPTAL: 9.6
ECHO MV MAX VELOCITY: 89.6 CM/S
ECHO MV MEAN GRADIENT: 2 MMHG
ECHO MV PEAK GRADIENT: 3 MMHG
ECHO MV PRESSURE HALF TIME (PHT): 170 MS
ECHO MV VTI: 31.1 CM
ECHO PV MAX VELOCITY: 107 CM/S
ECHO PV MEAN GRADIENT: 2 MMHG
ECHO PV PEAK INSTANTANEOUS GRADIENT SYSTOLIC: 5 MMHG
ECHO PV VTI: 20.2 CM
ECHO PVEIN A DURATION: 88 MS
ECHO PVEIN A VELOCITY: 32.1 CM/S
ECHO RA AREA 4C: 14 CM2
ECHO RIGHT VENTRICULAR SYSTOLIC PRESSURE (RVSP): 38 MMHG
ECHO RV INTERNAL DIMENSION: 3.76 CM
ECHO RV TAPSE: 1.6 CM (ref 1.5–2)
ECHO TV MAX VELOCITY: 274 CM/S
ECHO TV REGURGITANT PEAK GRADIENT: 30 MMHG
EOSINOPHIL # BLD: 0.2 K/UL (ref 0–0.4)
EOSINOPHIL NFR BLD: 2 % (ref 0–7)
ERYTHROCYTE [DISTWIDTH] IN BLOOD BY AUTOMATED COUNT: 14.3 % (ref 11.5–14.5)
EST. AVERAGE GLUCOSE BLD GHB EST-MCNC: 140 MG/DL
GLOBULIN SER CALC-MCNC: 3.1 G/DL (ref 2–4)
GLUCOSE BLD STRIP.AUTO-MCNC: 111 MG/DL (ref 65–117)
GLUCOSE BLD STRIP.AUTO-MCNC: 128 MG/DL (ref 65–117)
GLUCOSE BLD STRIP.AUTO-MCNC: 164 MG/DL (ref 65–117)
GLUCOSE BLD STRIP.AUTO-MCNC: 78 MG/DL (ref 65–117)
GLUCOSE SERPL-MCNC: 117 MG/DL (ref 65–100)
HBA1C MFR BLD: 6.5 % (ref 4–5.6)
HCT VFR BLD AUTO: 44 % (ref 35–47)
HDLC SERPL-MCNC: 68 MG/DL
HDLC SERPL: 2.3 {RATIO} (ref 0–5)
HGB BLD-MCNC: 13.9 G/DL (ref 11.5–16)
IMM GRANULOCYTES # BLD AUTO: 0 K/UL (ref 0–0.04)
IMM GRANULOCYTES NFR BLD AUTO: 0 % (ref 0–0.5)
LA VOL DISK BP: 50 CM3 (ref 22–52)
LDLC SERPL CALC-MCNC: 66.8 MG/DL (ref 0–100)
LEFT ARM BP: 142 MMHG
LEFT CCA DIST DIAS: 12.9 CM/S
LEFT CCA DIST SYS: 75.7 CM/S
LEFT CCA PROX DIAS: 21.5 CM/S
LEFT CCA PROX SYS: 104 CM/S
LEFT ECA DIAS: 10.2 CM/S
LEFT ECA SYS: 53.2 CM/S
LEFT ICA DIST DIAS: 33.8 CM/S
LEFT ICA DIST SYS: 86.5 CM/S
LEFT ICA PROX DIAS: 19.9 CM/S
LEFT ICA PROX SYS: 48.9 CM/S
LEFT ICA/CCA SYS: 0.65
LIPID PROFILE,FLP: NORMAL
LYMPHOCYTES # BLD: 2.5 K/UL (ref 0.8–3.5)
LYMPHOCYTES NFR BLD: 33 % (ref 12–49)
MCH RBC QN AUTO: 28.7 PG (ref 26–34)
MCHC RBC AUTO-ENTMCNC: 31.6 G/DL (ref 30–36.5)
MCV RBC AUTO: 90.9 FL (ref 80–99)
MONOCYTES # BLD: 0.6 K/UL (ref 0–1)
MONOCYTES NFR BLD: 8 % (ref 5–13)
MV DEC SLOPE: 1290 MM/S2
MV DEC SLOPE: 1290 MM/S2
NEUTS SEG # BLD: 4.3 K/UL (ref 1.8–8)
NEUTS SEG NFR BLD: 56 % (ref 32–75)
NRBC # BLD: 0 K/UL (ref 0–0.01)
NRBC BLD-RTO: 0 PER 100 WBC
P-R INTERVAL, ECG05: 162 MS
PERFORMED BY, TECHID: ABNORMAL
PERFORMED BY, TECHID: ABNORMAL
PERFORMED BY, TECHID: NORMAL
PERFORMED BY, TECHID: NORMAL
PLATELET # BLD AUTO: 240 K/UL (ref 150–400)
PMV BLD AUTO: 12.3 FL (ref 8.9–12.9)
POTASSIUM SERPL-SCNC: 3.8 MMOL/L (ref 3.5–5.1)
PROT SERPL-MCNC: 6.2 G/DL (ref 6.4–8.2)
Q-T INTERVAL, ECG07: 422 MS
QRS DURATION, ECG06: 96 MS
QTC CALCULATION (BEZET), ECG08: 486 MS
RBC # BLD AUTO: 4.84 M/UL (ref 3.8–5.2)
RIGHT ARM BP: 122 MMHG
RIGHT CCA DIST DIAS: 15 CM/S
RIGHT CCA DIST SYS: 53.7 CM/S
RIGHT CCA PROX DIAS: 15.5 CM/S
RIGHT CCA PROX SYS: 104 CM/S
RIGHT ECA DIAS: 12.9 CM/S
RIGHT ECA SYS: 80.6 CM/S
RIGHT ICA DIST DIAS: 21.1 CM/S
RIGHT ICA DIST SYS: 58 CM/S
RIGHT ICA PROX DIAS: 13.3 CM/S
RIGHT ICA PROX SYS: 49.8 CM/S
RIGHT ICA/CCA SYS: 0.93
RIGHT VERTEBRAL DIAS: 12 CM/S
RIGHT VERTEBRAL SYS: 42.1 CM/S
SODIUM SERPL-SCNC: 143 MMOL/L (ref 136–145)
TRIGL SERPL-MCNC: 96 MG/DL (ref ?–150)
VAS LEFT SUBCLAVIAN PROX EDV: 0 CM/S
VAS LEFT SUBCLAVIAN PROX PSV: 83.2 CM/S
VAS RIGHT SUBCLAVIAN PROX EDV: 0 CM/S
VAS RIGHT SUBCLAVIAN PROX PSV: 121 CM/S
VENTRICULAR RATE, ECG03: 80 BPM
VLDLC SERPL CALC-MCNC: 19.2 MG/DL
WBC # BLD AUTO: 7.7 K/UL (ref 3.6–11)

## 2021-11-03 PROCEDURE — 92610 EVALUATE SWALLOWING FUNCTION: CPT

## 2021-11-03 PROCEDURE — 36415 COLL VENOUS BLD VENIPUNCTURE: CPT

## 2021-11-03 PROCEDURE — 70551 MRI BRAIN STEM W/O DYE: CPT

## 2021-11-03 PROCEDURE — 82962 GLUCOSE BLOOD TEST: CPT

## 2021-11-03 PROCEDURE — 85025 COMPLETE CBC W/AUTO DIFF WBC: CPT

## 2021-11-03 PROCEDURE — 97165 OT EVAL LOW COMPLEX 30 MIN: CPT

## 2021-11-03 PROCEDURE — 96372 THER/PROPH/DIAG INJ SC/IM: CPT

## 2021-11-03 PROCEDURE — 74011250637 HC RX REV CODE- 250/637: Performed by: FAMILY MEDICINE

## 2021-11-03 PROCEDURE — 65270000029 HC RM PRIVATE

## 2021-11-03 PROCEDURE — 93880 EXTRACRANIAL BILAT STUDY: CPT

## 2021-11-03 PROCEDURE — 74011250637 HC RX REV CODE- 250/637: Performed by: STUDENT IN AN ORGANIZED HEALTH CARE EDUCATION/TRAINING PROGRAM

## 2021-11-03 PROCEDURE — 80053 COMPREHEN METABOLIC PANEL: CPT

## 2021-11-03 PROCEDURE — G0378 HOSPITAL OBSERVATION PER HR: HCPCS

## 2021-11-03 PROCEDURE — 74011250636 HC RX REV CODE- 250/636: Performed by: FAMILY MEDICINE

## 2021-11-03 PROCEDURE — 93306 TTE W/DOPPLER COMPLETE: CPT

## 2021-11-03 RX ORDER — ASPIRIN 325 MG
325 TABLET ORAL DAILY
Status: DISCONTINUED | OUTPATIENT
Start: 2021-11-03 | End: 2021-11-04 | Stop reason: HOSPADM

## 2021-11-03 RX ORDER — ATORVASTATIN CALCIUM 40 MG/1
40 TABLET, FILM COATED ORAL DAILY
Status: DISCONTINUED | OUTPATIENT
Start: 2021-11-03 | End: 2021-11-04 | Stop reason: HOSPADM

## 2021-11-03 RX ADMIN — SODIUM CHLORIDE 25 ML/HR: 9 INJECTION, SOLUTION INTRAVENOUS at 05:24

## 2021-11-03 RX ADMIN — ENOXAPARIN SODIUM 40 MG: 100 INJECTION SUBCUTANEOUS at 12:05

## 2021-11-03 RX ADMIN — Medication 5000 UNITS: at 12:05

## 2021-11-03 RX ADMIN — MONTELUKAST 10 MG: 10 TABLET, FILM COATED ORAL at 12:05

## 2021-11-03 RX ADMIN — ACETAMINOPHEN 650 MG: 325 TABLET ORAL at 12:05

## 2021-11-03 RX ADMIN — BUSPIRONE HYDROCHLORIDE 10 MG: 10 TABLET ORAL at 12:05

## 2021-11-03 RX ADMIN — ASPIRIN 325 MG ORAL TABLET 325 MG: 325 PILL ORAL at 12:05

## 2021-11-03 RX ADMIN — BUSPIRONE HYDROCHLORIDE 10 MG: 10 TABLET ORAL at 17:29

## 2021-11-03 RX ADMIN — ATORVASTATIN CALCIUM 40 MG: 40 TABLET, FILM COATED ORAL at 12:05

## 2021-11-03 RX ADMIN — HYDRALAZINE HYDROCHLORIDE 10 MG: 10 TABLET, FILM COATED ORAL at 22:00

## 2021-11-03 NOTE — PROGRESS NOTES
NEUROLOGY  PROGRESS NOTE    Admission History/Pertinent Events  Becki Doshi is a 64y.o. year old female who presented on 11/2/2021. Patient has a past medical history of HTN, HL, DM2, CHF, COPD, Asthma, ANTONI who presented with speech difficulties and facial droop. Per chart review, patient was talking to her landlord when she began have difficulty getting her words out  And was noted to have a facial  Droop. Emergent CT head revealed no acute findings but did reveal remote ischemia in the left posterior parietal region. Patient was not deemed a candidate for tPA or neuro intervention as patient's does resolved while in the ED. MRI of the brain without contrast on 11/03 revealed no acute or subacute ischemia but did reveal the remote left parietal infarct. Carotid ultrasound on 11/03 revealed no evidence of carotid stenosis and physiologically forward flowing vertebral arteries. TTE on 11/03 revealed EF of 61% with normal RA/LA. LDL is 66.8.  HgbA1c is 6.5.     Home Stroke Prophylaxis: ASA 81, Atorvastatin 20      ASSESSMENT/PLAN      Impression  Reviewed patient's MRI of the brain which did not reveal any acute or subacute ischemia. Etiology patient's present symptoms once again possibly related to recrudescence of old stroke symptom or possibly a TIA. Will continue patient on antiplatelet statin therapy for neurovascular prophylaxis. No further neurologic workup is necessary at the current time.         Plan      Episodic Facial Droop, Speech Difficulties, Likely Related To Recrudescence of Left Parietal Ischemia vs TIA  -Stroke Prophylaxis: , Atorvastatin 40   -SBP Goal < 160  -Glucose Goal < 180  -Head of Bed at 30 degrees  -VTE Prophylaxis: Enoxaparin 40 daily  -PT/OT/ST as needed  -Management of metabolic/infectious derangements to referring teams    Patient follow up with Neurology in 3 weeks from discharge     Please contact Neurology with any further questions/concerns        SUBJECTIVE   No significant changes on neurological examination. Physical/Neurological Exam  Patient awake, alert; following central and peripheral commands   No expressive or receptive aphasia;  No dysarthria   Pupils react to light bilaterally; EOM Intact   No visual field deficits on gross exam   No facial droop   Motor: 5-/5 Throughout   No abnormal movements   Sensation to light touch intact grossly throughout       OBJECTIVE  Vital Signs  Temp:  [97.1 °F (36.2 °C)-98.4 °F (36.9 °C)]   Pulse (Heart Rate):  [60-80]   BP: (103-168)/()   Resp Rate:  [16-20]   O2 Sat (%):  [92 %-100 %]   Weight:  [113.4 kg (250 lb)]     MEDICATIONS    Current Facility-Administered Medications:     atorvastatin (LIPITOR) tablet 40 mg, 40 mg, Oral, DAILY, Georgette France MD    aspirin tablet 325 mg, 325 mg, Oral, DAILY, Georgette France MD    hydrALAZINE (APRESOLINE) tablet 10 mg, 10 mg, Oral, TID, Dima Bass MD, 10 mg at 11/02/21 2123    busPIRone (BUSPAR) tablet 10 mg, 10 mg, Oral, TID, Dima Bass MD, 10 mg at 11/02/21 2123    losartan (COZAAR) tablet 100 mg, 100 mg, Oral, DAILY, Mohiuddin, Gretel Lundborg, MD    amLODIPine (NORVASC) tablet 10 mg, 10 mg, Oral, DAILY, Mohiuddin, Gretel Lundborg, MD    montelukast (SINGULAIR) tablet 10 mg, 10 mg, Oral, DAILY, Mohiuddin, Gretel Lundborg, MD    cholecalciferol (VITAMIN D3) (5000 Units/125 mcg) tablet 5,000 Units, 5,000 Units, Oral, DAILY, Mohiuddin, Gretel Lundborg, MD    insulin lispro (HUMALOG) injection, , SubCUTAneous, AC&HS, Mohiuddin, Gretel Lundborg, MD    glucose chewable tablet 16 g, 4 Tablet, Oral, PRN, Dima Bass MD    dextrose (D50W) injection syrg 12.5-25 g, 25-50 mL, IntraVENous, PRN, Dima Bass MD    glucagon (GLUCAGEN) injection 1 mg, 1 mg, IntraMUSCular, PRN, Dima Bass MD    0.9% sodium chloride infusion, 25 mL/hr, IntraVENous, CONTINUOUS, Dima Bass MD, Last Rate: 25 mL/hr at 11/03/21 0524, 25 mL/hr at 11/03/21 0524   acetaminophen (TYLENOL) tablet 650 mg, 650 mg, Oral, Q6H PRN **OR** acetaminophen (TYLENOL) suppository 650 mg, 650 mg, Rectal, Q6H PRN, Edgard Bass MD    polyethylene glycol (MIRALAX) packet 17 g, 17 g, Oral, DAILY PRN, Edgard Bass MD    ondansetron (ZOFRAN ODT) tablet 4 mg, 4 mg, Oral, Q8H PRN **OR** ondansetron (ZOFRAN) injection 4 mg, 4 mg, IntraVENous, Q6H PRN, Dima Bass MD    enoxaparin (LOVENOX) injection 40 mg, 40 mg, SubCUTAneous, DAILY, Edgard Bass MD      Labs: I've reviewed the labs for today     This document has been prepared by the Dragon voice recognition system, typographical errors may have occurred.  Attempts have been made to correct errors, however inadvertent errors may persist.

## 2021-11-03 NOTE — PROGRESS NOTES
SPEECH LANGUAGE PATHOLOGY BEDSIDE SWALLOW EVALUATION  Patient: Harsh Cabrera (13 y.o. female)  Date: 11/3/2021  Primary Diagnosis: TIA (transient ischemic attack) [G45.9]       Precautions:     ASSESSMENT :  Based on the objective data described below, the patient presents with [facility] [place] swallow function. Patient with timely and efficient swallow. No oral residue. Pharyngeal phase appears largely [facility] [place]. HLE adequate to palpation. Patient tolerated single and consecutive straw sips thin liquid, puree, and regular with no overt s/s of pen/aspiration. Patient maintained a clear vocal quality following all trials. Patient reports no concerns regarding speech and language. PLAN :  Recommendations and Planned Interventions:  Recommend continue regular, thin liquid diet. Adhere to aspiration precautions  Frequency/Duration: ST not warranted at this time. Please re-consult if medically indicated  Discharge Recommendations: To Be Determined     SUBJECTIVE:   Patient is agreeable to beginning eval. Patient's boyfriend present, with patient consent     OBJECTIVE:     CT Results  (Last 48 hours)               11/02/21 1347  CT CODE NEURO HEAD WO CONTRAST Final result    Impression:  Old stroke. No acute findings       Narrative:  CT dose reduction was achieved through use of a standardized protocol tailored   for this examination and automatic exposure control for dose modulation. Noncontrast study shows chronic infarct in the left posterior parietal plus   minus occipital lobes, MCA territory, incompletely penetrated, centered on   subcortical white matter and of CSF density. No acute abnormality seen   gray-white matter. No mass effect or hemorrhage. Ventricles are symmetric and   normal in size. There is no significant bone finding.  I made multiple calls to   the emergency department and was unable to give this report                  Past Medical History:   Diagnosis Date    Asthma     Chronic obstructive pulmonary disease ([facility] [address]     Congestive heart failure, unspecified     Diabetes ([facility] [address] [facility]     High cholesterol     Morbid obesity ([facility] [address] [facility]     Sleep apnea      Past Surgical History:   Procedure Laterality Date    HX GYN      partial hysterectomy     Prior Level of Function/Home Situation:   Home Situation  Home Environment: Apartment  # Steps to Enter: 0  One/Two Story Residence: One story  Living Alone: Yes  Support Systems: Child(kalee)  Patient Expects to be Discharged to[de-identified] Apartment  Current DME Used/Available at Home: None  Diet prior to admission: Regular, thin  Current Diet: Regular, thin    Cognitive and Communication Status:  Neurologic State: Alert  Orientation Level: Oriented X4  Cognition: Follows commands    Swallowing Evaluation:   Oral Assessment:  Oral Assessment  Labial: No impairment  Dentition: Full; Natural  Oral Hygiene:  (Adequate)  Lingual: No impairment     P.O. Trials:  Patient Position:  (Upright in chair )  Vocal quality prior to P.O.: No impairment  Consistency Presented: Thin liquid; Solid; Puree  How Presented: Self-fed/presented; Successive swallows; Straw; Spoon  How Much:  (Multiple presentations)  Bolus Acceptance: No impairment  Bolus Formation/Control: No impairment  Propulsion: No impairment  Oral Residue: None  Initiation of Swallow: No impairment  Laryngeal Elevation: Functional  Aspiration Signs/Symptoms: None  Pharyngeal Phase Characteristics: No impairment, issues, or problems   Oral Phase Severity: No impairment  Pharyngeal Phase Severity : No impairment     Voice:  Vocal Quality: No impairment    Pain: 0    After treatment:   Patient left in no apparent distress in bed, Call bell within reach and Nursing notified    COMMUNICATION/EDUCATION:   Patient was educated regarding purpose of SLP assessment, POC, diet recs and sw safety precautions. Patient demonstrated Good understanding as evidenced by verbal agreement.     The patient's plan of care including recommendations, planned interventions, and recommended diet changes were discussed with: Registered nurse and Physician.      Thank you for this referral.  Chema Hill M.S. CCC-SLP  Time Calculation: 14 mins

## 2021-11-03 NOTE — PROGRESS NOTES
OCCUPATIONAL THERAPY EVALUATION/DISCHARGE  Patient: Chester Kumar (68 y.o. female)  Date: 11/3/2021  Primary Diagnosis: TIA (transient ischemic attack) [G45.9]        Precautions:        ASSESSMENT  Pt is 63 y/o female [facility] with slurred speech and adm 11/2/2021 for r/o TIA (head CT negative for acute events showing \"chronic infarct in left posterior parietal plus minus occipital lobes, MCA territory, incompletely penetrated, centered on subcortical white matter and of CSF density\")    Pt has hx of HTN, DM, CHF, ANTONI, COPD on 2L O2 via NC at home. Pt received semi supine in bed A&Ox 4 and agreeable for OT eval/tx. Per pt report, pt lives alone in first floor apartment with walk in entrance and is independent for self care and functional transfers/mobility however on 2L O2 via NC at home as needed per pt report. Pt currently presents with intact martha UE strength, intact martha UE coordination, intact martha UE sensation and functioning at baseline independence/MI for self care and functional transfers/mobility able to manage own lines at this time. Pt with no acute OT needs at this time thus will D/C from skilled OT services after eval. Recommend discharge to home independently when medically appropriate. Current Level of Function (ADLs/self-care): MI/independent    Other factors to consider for discharge: close to/at baseline functional status     PLAN :  Recommend with staff: allow toilet transfers    Recommendation for discharge: (in order for the patient to meet his/her long term goals)  No skilled occupational therapy/ follow up rehabilitation needs identified at this time. This discharge recommendation:  Has been made in collaboration with the attending provider and/or case management    IF patient discharges home will need the following DME: none       SUBJECTIVE:   Patient stated I feel better.     OBJECTIVE DATA SUMMARY:   HISTORY:   Past Medical History:   Diagnosis Date    Asthma     Chronic obstructive pulmonary disease (HCC)     Congestive heart failure, unspecified     Diabetes ([facility] [address] [facility]     High cholesterol     Morbid obesity ([facility] [address]     Sleep apnea      Past Surgical History:   Procedure Laterality Date    HX GYN      partial hysterectomy       Prior Level of Function/Environment/Context: Pt independent for ADLS/IADLS, independent with mobility prior to admission. Expanded or extensive additional review of patient history:   Home Situation  Home Environment: Apartment  # Steps to Enter: 0  One/Two Story Residence: One story  Living Alone: Yes  Patient Expects to be Discharged to[de-identified] Apartment  Current DME Used/Available at Home: None    EXAMINATION OF PERFORMANCE DEFICITS:  Cognitive/Behavioral Status:  Neurologic State: Alert  Orientation Level: Oriented X4     Range of Motion:  AROM: Within functional limits  PROM: Within functional limits     Strength:  Strength: Within functional limits     Coordination:  Coordination: Within functional limits  Fine Motor Skills-Upper: Left Intact; Right Intact    Gross Motor Skills-Upper: Left Intact; Right Intact    Tone & Sensation:  Tone: Normal  Sensation: Intact     Balance:  Sitting: Intact; Without support  Standing: Intact; Without support    Functional Mobility and Transfers for ADLs:  Bed Mobility:  Rolling: Modified independent  Supine to Sit: Modified independent  Scooting: Modified independent    Transfers:  Sit to Stand: Modified independent  Stand to Sit: Modified independent  Bed to Chair: Independent  Bathroom Mobility: Independent  Toilet Transfer : Independent  Assistive Device : Gait Belt    ADL Assessment:     Oral Facial Hygiene/Grooming: Independent    Lower Body Dressing: Independent    Toileting: Independent     ADL Intervention and task modifications:     Grooming  Grooming Assistance: Independent  Position Performed: Standing  Washing Face: Independent  Washing Hands: Independent  Brushing Teeth:  Independent    Lower Body Dressing Assistance  Socks: Independent  Position Performed: Seated edge of bed    Toileting  Toileting Assistance: Independent (simulated)  Bladder Hygiene: Independent  Bowel Hygiene: Independent  Clothing Management: [address] AM-PACTM \"6 Clicks\"                                                       Daily Activity Inpatient Short Form  How much help from another person does the patient currently need. .. Total; A Lot A Little None   1. Putting on and taking off regular lower body clothing? []  1 []  2 []  3 [x]  4   2. Bathing (including washing, rinsing, drying)? []  1 []  2 []  3 [x]  4   3. Toileting, which includes using toilet, bedpan or urinal? [] 1 []  2 []  3 [x]  4   4. Putting on and taking off regular upper body clothing? []  1 []  2 []  3 [x]  4   5. Taking care of personal grooming such as brushing teeth? []  1 []  2 []  3 [x]  4   6. Eating meals? []  1 []  2 []  3 [x]  4   © 2007, Trustees of [address] [place] [address] [address] [id number], under license to [employer]. All rights reserved     Score: 24/24     Interpretation of Tool:  Represents clinically-significant functional categories (i.e. Activities of daily living). Percentage of Impairment CH    0%   CI    1-19% CJ    20-39% CK    40-59% CL    60-79% CM    80-99% CN     100%   [facility]  Score 6-24 24 23 20-22 15-19 10-14 7-9 6         Occupational Therapy Evaluation Charge Determination   History Examination Decision-Making   LOW Complexity : Brief history review  LOW Complexity : 1-3 performance deficits relating to physical, cognitive , or psychosocial skils that result in activity limitations and / or participation restrictions  LOW Complexity : No comorbidities that affect functional and no verbal or physical assistance needed to complete eval tasks       Based on the above components, the patient evaluation is determined to be of the following complexity level: LOW   Pain Rating:  No pain reported    Activity Tolerance:    WNL  Please refer to the flowsheet for vital signs taken during this treatment. After treatment patient left in no apparent distress:    Standing at sink with family present  and nursing aware    COMMUNICATION/EDUCATION:   The patients plan of care was discussed with: Physical therapist and Registered nurse.      Thank you for this referral.  Palak Gooden  Time Calculation: 15 mins

## 2021-11-03 NOTE — PROGRESS NOTES
Hospitalist Progress Note    Subjective:   Daily Progress Note: 11/3/2021 11:05 AM       Patient is a 64y.o. year old female with past medical history of hypertension diabetes congestive heart failure obstructive sleep apnea came to emergency room because of slurred speech patient was talking to her landlord to pay her rent early this morning and the landlord noticed she have trouble speaking. The patient was sent to the ER seen by the ER physician CT scan of the head done shows old stroke patient was recommend to be admitted for further evaluation and treatment    11/3  Patient had just returned from imaging. Family member in room also. She states she feels well. No weakness or facial droop. Speech has improved. She has no concerns or questions at this time.  Denies chest pain, shortness of breath, abdominal pain, nausea or vomiting    Current Facility-Administered Medications   Medication Dose Route Frequency    atorvastatin (LIPITOR) tablet 40 mg  40 mg Oral DAILY    aspirin tablet 325 mg  325 mg Oral DAILY    hydrALAZINE (APRESOLINE) tablet 10 mg  10 mg Oral TID    busPIRone (BUSPAR) tablet 10 mg  10 mg Oral TID    losartan (COZAAR) tablet 100 mg  100 mg Oral DAILY    amLODIPine (NORVASC) tablet 10 mg  10 mg Oral DAILY    montelukast (SINGULAIR) tablet 10 mg  10 mg Oral DAILY    cholecalciferol (VITAMIN D3) (5000 Units/125 mcg) tablet 5,000 Units  5,000 Units Oral DAILY    insulin lispro (HUMALOG) injection   SubCUTAneous AC&HS    glucose chewable tablet 16 g  4 Tablet Oral PRN    dextrose (D50W) injection syrg 12.5-25 g  25-50 mL IntraVENous PRN    glucagon (GLUCAGEN) injection 1 mg  1 mg IntraMUSCular PRN    0.9% sodium chloride infusion  25 mL/hr IntraVENous CONTINUOUS    acetaminophen (TYLENOL) tablet 650 mg  650 mg Oral Q6H PRN    Or    acetaminophen (TYLENOL) suppository 650 mg  650 mg Rectal Q6H PRN    polyethylene glycol (MIRALAX) packet 17 g  17 g Oral DAILY PRN    ondansetron (ZOFRAN ODT) tablet 4 mg  4 mg Oral Q8H PRN    Or    ondansetron (ZOFRAN) injection 4 mg  4 mg IntraVENous Q6H PRN    enoxaparin (LOVENOX) injection 40 mg  40 mg SubCUTAneous DAILY          Objective:     Visit Vitals  /63   Pulse 60   Temp 97.1 °F (36.2 °C)   Resp 18   Ht 5' 2\" (1.575 m)   Wt 113.4 kg (250 lb)   SpO2 99%   BMI 45.73 kg/m²    O2 Flow Rate (L/min): 2 l/min O2 Device: Nasal cannula    Temp (24hrs), Av.8 °F (36.6 °C), Min:97.1 °F (36.2 °C), Max:98.4 °F (36.9 °C)      No intake/output data recorded. No intake/output data recorded. Physical Exam:   Constitutional: pt is oriented to person, place, and time. HENT:   Head: Normocephalic and atraumatic. Eyes: Pupils are equal, round, and reactive to light. EOM are normal.   Cardiovascular: Normal rate, regular rhythm and normal heart sounds. Pulmonary/Chest: Breath sounds normal. No wheezes. No rales. Exhibits no tenderness. Abdominal: Soft. Bowel sounds are normal. There is no abdominal tenderness. There is no rebound and no guarding. Musculoskeletal: Normal range of motion. Neurological: pt is alert and oriented to person, place, and time. Alert. Normal strength. No cranial nerve deficit or sensory deficit. Displays a negative Romberg sign.         Data Review    Recent Results (from the past 24 hour(s))   CBC WITH AUTOMATED DIFF    Collection Time: 21  1:35 PM   Result Value Ref Range    WBC 9.9 3.6 - 11.0 K/uL    RBC 5.09 3.80 - 5.20 M/uL    HGB 14.5 11.5 - 16.0 g/dL    HCT 46.5 35.0 - 47.0 %    MCV 91.4 80.0 - 99.0 FL    MCH 28.5 26.0 - 34.0 PG    MCHC 31.2 30.0 - 36.5 g/dL    RDW 14.3 11.5 - 14.5 %    PLATELET 642 (L) 678 - 400 K/uL    MPV 12.9 8.9 - 12.9 FL    NRBC 0.0 0.0  WBC    ABSOLUTE NRBC 0.00 0.00 - 0.01 K/uL    NEUTROPHILS 73 32 - 75 %    LYMPHOCYTES 20 12 - 49 %    MONOCYTES 6 5 - 13 %    EOSINOPHILS 1 0 - 7 %    BASOPHILS 0 0 - 1 %    IMMATURE GRANULOCYTES 0 0 - 0.5 %    ABS.  NEUTROPHILS 7.2 1.8 - 8.0 K/UL ABS. LYMPHOCYTES 2.0 0.8 - 3.5 K/UL    ABS. MONOCYTES 0.6 0.0 - 1.0 K/UL    ABS. EOSINOPHILS 0.1 0.0 - 0.4 K/UL    ABS. BASOPHILS 0.0 0.0 - 0.1 K/UL    ABS. IMM. GRANS. 0.0 0.00 - 0.04 K/UL    DF AUTOMATED     METABOLIC PANEL, COMPREHENSIVE    Collection Time: 11/02/21  1:35 PM   Result Value Ref Range    Sodium 141 136 - 145 mmol/L    Potassium 3.6 3.5 - 5.1 mmol/L    Chloride 110 (H) 97 - 108 mmol/L    CO2 26 21 - 32 mmol/L    Anion gap 5 5 - 15 mmol/L    Glucose 143 (H) 65 - 100 mg/dL    BUN 13 6 - 20 mg/dL    Creatinine 0.66 0.55 - 1.02 mg/dL    BUN/Creatinine ratio 20 12 - 20      GFR est AA >60 >60 ml/min/1.73m2    GFR est non-AA >60 >60 ml/min/1.73m2    Calcium 9.5 8.5 - 10.1 mg/dL    Bilirubin, total 0.8 0.2 - 1.0 mg/dL    AST (SGOT) 16 15 - 37 U/L    ALT (SGPT) 23 12 - 78 U/L    Alk.  phosphatase 77 45 - 117 U/L    Protein, total 7.2 6.4 - 8.2 g/dL    Albumin 3.8 3.5 - 5.0 g/dL    Globulin 3.4 2.0 - 4.0 g/dL    A-G Ratio 1.1 1.1 - 2.2     PROTHROMBIN TIME + INR    Collection Time: 11/02/21  1:35 PM   Result Value Ref Range    Prothrombin time 13.4 11.9 - 14.7 sec    INR 1.0 0.9 - 1.1     PTT    Collection Time: 11/02/21  1:35 PM   Result Value Ref Range    aPTT 23.9 21.2 - 34.1 sec    aPTT, therapeutic range   82 - 109 sec   TROPONIN-HIGH SENSITIVITY    Collection Time: 11/02/21  1:35 PM   Result Value Ref Range    Troponin-High Sensitivity 17 0 - 51 ng/L   URINALYSIS W/MICROSCOPIC    Collection Time: 11/02/21  4:45 PM   Result Value Ref Range    Color Yellow/Straw      Appearance Turbid (A) Clear      Specific gravity 1.008 1.003 - 1.030      pH (UA) 8.0 5.0 - 8.0      Protein Negative Negative mg/dL    Glucose Negative Negative mg/dL    Ketone Negative Negative mg/dL    Bilirubin Negative Negative      Blood Negative Negative      Urobilinogen 0.1 0.1 - 1.0 EU/dL    Nitrites Negative Negative      Leukocyte Esterase Small (A) Negative      WBC 0-4 0 - 4 /hpf    RBC 0-5 0 - 5 /hpf    Bacteria Negative Negative /hpf   ECHO ADULT COMPLETE    Collection Time: 11/02/21  6:21 PM   Result Value Ref Range    LV ED Vol A4C 119.00 cm3    LV ED Vol A2C 143.00 cm3    LV ES Vol A4C 43.00 cm3    LV ES Vol A2C 43.20 cm3    IVSd 0.86 0.6 - 0.9 cm    LVIDd 5.23 3.9 - 5.3 cm    LVIDs 3.51 cm    LVOT Peak Velocity 106.00 cm/s    LVOT Peak Gradient 4.00 mmHg    LVPWd 0.62 0.6 - 0.9 cm    LV E' Septal Velocity 5.95 cm/s    E/E' lateral 8.70     E/E' septal 9.60     Left Atrium Major Axis 5.01 cm    LA Area 4C 17.40 cm2    LA Volume DISK BP 50.00 22 - 52 cm3    Left Atrium Major Axis 3.80 cm    Left Atrium Major Axis 3.80 cm    LA Vol Index 23.90 16 - 28 ml/m2    Aortic Valve Systolic Peak Velocity 849.40 cm/s    AoV PG 6.00 mmHg    Ao Root D 3.00 cm    AO ASC D 2.70 cm    MR Peak Gradient 31.00 mmHg    Mitral Valve Max Velocity 89.60 cm/s    MV Peak Gradient 3.00 mmHg    Mitral Valve Deceleration [place] 1,290.00 mm/s2    Mitral Valve Deceleration [place] 1,290.00 mm/s2    Mitral Valve E Wave Deceleration Time 264.00 ms    Mitral Valve Pressure Half-time 170.00 ms    MV A Fernando 75.60 cm/s    MV E Fernando 57.10 cm/s    MV Mean Gradient 2.00 mmHg    Mitral Valve Annulus Velocity Time Integral 31.10 cm    MVA (PHT) 1.29 cm2    MV E/A 0.80     Pulmonic Valve Systolic Mean Gradient 2.69 mmHg    Pulmonic Valve Systolic Velocity Time Integral 20.20 cm    Pulmonic Valve Max Velocity 107.00 cm/s    Pulmonic Valve Systolic Peak Instantaneous Gradient 5.00 mmHg    P Vein A Dur 88.00 ms    Pulmonary Vein \"A\" Wave Velocity 32.10 cm/s    Est. RA Pressure 8.00 mmHg    RVIDd 3.76 cm    RVSP 38.00 mmHg    Tricuspid Valve Max Velocity 274.00 cm/s    Triscuspid Valve Regurgitation Peak Gradient 30.00 mmHg    Tapse 1.60 1.5 - 2.0 cm    Right Atrial Area 4C 14.00 cm2   HEMOGLOBIN A1C WITH EAG    Collection Time: 11/02/21  8:44 PM   Result Value Ref Range    Hemoglobin A1c 6.5 (H) 4.0 - 5.6 %    Est. average glucose 140 mg/dL   LIPID PANEL    Collection Time: 11/02/21  8:44 PM   Result Value Ref Range    LIPID PROFILE        Cholesterol, total 154 <200 mg/dL    Triglyceride 96 <150 mg/dL    HDL Cholesterol 68 mg/dL    LDL, calculated 66.8 0 - 100 mg/dL    VLDL, calculated 19.2 mg/dL    CHOL/HDL Ratio 2.3 0.0 - 5.0     GLUCOSE, POC    Collection Time: 11/02/21  8:53 PM   Result Value Ref Range    Glucose (POC) 142 (H) 65 - 117 mg/dL    Performed by [email or web address], COMPREHENSIVE    Collection Time: 11/03/21  7:13 AM   Result Value Ref Range    Sodium 143 136 - 145 mmol/L    Potassium 3.8 3.5 - 5.1 mmol/L    Chloride 110 (H) 97 - 108 mmol/L    CO2 29 21 - 32 mmol/L    Anion gap 4 (L) 5 - 15 mmol/L    Glucose 117 (H) 65 - 100 mg/dL    BUN 11 6 - 20 mg/dL    Creatinine 0.55 0.55 - 1.02 mg/dL    BUN/Creatinine ratio 20 12 - 20      GFR est AA >60 >60 ml/min/1.73m2    GFR est non-AA >60 >60 ml/min/1.73m2    Calcium 8.8 8.5 - 10.1 mg/dL    Bilirubin, total 0.5 0.2 - 1.0 mg/dL    AST (SGOT) 15 15 - 37 U/L    ALT (SGPT) 20 12 - 78 U/L    Alk. phosphatase 67 45 - 117 U/L    Protein, total 6.2 (L) 6.4 - 8.2 g/dL    Albumin 3.1 (L) 3.5 - 5.0 g/dL    Globulin 3.1 2.0 - 4.0 g/dL    A-G Ratio 1.0 (L) 1.1 - 2.2     CBC WITH AUTOMATED DIFF    Collection Time: 11/03/21  7:13 AM   Result Value Ref Range    WBC 7.7 3.6 - 11.0 K/uL    RBC 4.84 3.80 - 5.20 M/uL    HGB 13.9 11.5 - 16.0 g/dL    HCT 44.0 35.0 - 47.0 %    MCV 90.9 80.0 - 99.0 FL    MCH 28.7 26.0 - 34.0 PG    MCHC 31.6 30.0 - 36.5 g/dL    RDW 14.3 11.5 - 14.5 %    PLATELET 966 532 - 004 K/uL    MPV 12.3 8.9 - 12.9 FL    NRBC 0.0 0.0  WBC    ABSOLUTE NRBC 0.00 0.00 - 0.01 K/uL    NEUTROPHILS 56 32 - 75 %    LYMPHOCYTES 33 12 - 49 %    MONOCYTES 8 5 - 13 %    EOSINOPHILS 2 0 - 7 %    BASOPHILS 1 0 - 1 %    IMMATURE GRANULOCYTES 0 0 - 0.5 %    ABS. NEUTROPHILS 4.3 1.8 - 8.0 K/UL    ABS. LYMPHOCYTES 2.5 0.8 - 3.5 K/UL    ABS. MONOCYTES 0.6 0.0 - 1.0 K/UL    ABS. EOSINOPHILS 0.2 0.0 - 0.4 K/UL    ABS.  BASOPHILS 0.0 0.0 - 0.1 K/UL    ABS. IMM. GRANS. 0.0 0.00 - 0.04 K/UL    DF AUTOMATED     GLUCOSE, POC    Collection Time: 11/03/21  7:50 AM   Result Value Ref Range    Glucose (POC) 111 65 - 117 mg/dL    Performed by Robert [facility]AHL CAROTID BILATERAL    Collection Time: 11/03/21 11:02 AM   Result Value Ref Range    Left CCA dist sys 75.7 cm/s    Left CCA dist [place] 12.9 cm/s    Left CCA prox sys 104.0 cm/s    Left CCA prox [place] 21.5 cm/s    Left ICA dist sys 86.5 cm/s    Left ICA dist [place] 33.8 cm/s    Left ICA prox sys 48.9 cm/s    Left ICA prox [place] 19.9 cm/s    Left ECA sys 53.2 cm/s    LEFT EXTERNAL CAROTID ARTERY D 10.20 cm/s    Left subclavian prox PSV 83.2 cm/s    Left subclavian prox EDV 0.0 cm/s    Right cca dist sys 53.7 cm/s    Right CCA dist [place] 15.0 cm/s    Right CCA prox sys 104.0 cm/s    Right CCA prox [place] 15.5 cm/s    Right ICA dist sys 58.0 cm/s    Right ICA dist [place]sey 21.1 cm/s    Right ICA prox sys 49.8 cm/s    Right ICA prox [place] 13.3 cm/s    Right eca sys 80.6 cm/s    RIGHT EXTERNAL CAROTID ARTERY D 12.90 cm/s    Right subclavian prox .0 cm/s    Right subclavian prox EDV 0.0 cm/s    Right vertebral sys 42.1 cm/s    RIGHT VERTEBRAL ARTERY D 12.00 cm/s    Left arm  mmHg    Right arm  mmHg    Right ICA/CCA sys 0.93     Left ICA/CCA sys 0.65    GLUCOSE, POC    Collection Time: 11/03/21 11:11 AM   Result Value Ref Range    Glucose (POC) 128 (H) 65 - 117 mg/dL    Performed by Domenic Robles          Assessment/Plan:     TIA  Diabetes mellitus  Hypertension  CHF  Operative sleep apnea     1.  TIA   Per Neuro   Episodic Facial Droop, Speech Difficulties, Likely Related To Recrudescence of Left Parietal Ischemia vs TIA   -Q6Hr NeuroChecks, TELE  -Stroke Prophylaxis: , Atorvastatin 40   -VTE Prophylaxis: Enoxaparin 40 daily  -Glucose Goal < 180  -Head of Bed at 30 degrees  -SBP Goal < 160  MRI Brain WO, CUS, TTE, HgbA1c, Lipid Panel--PENDING  CT of head shows old stroke, but no acute findings    2. Hypertension   -well controlled   -103/66 today    - continue amlodipine, hydralazine and losartan     3.  Diabetes    -continue humalog     PT/OT consult  Speech following

## 2021-11-03 NOTE — PROGRESS NOTES
Hospitalist Progress Note    Subjective:   Daily Progress Note: 11/3/2021 11:05 AM       Patient is a 64y.o. year old female with past medical history of hypertension diabetes congestive heart failure obstructive sleep apnea came to emergency room because of slurred speech patient was talking to her landlord to pay her rent early this morning and the landlord noticed she have trouble speaking. The patient was sent to the ER seen by the ER physician CT scan of the head done shows old stroke patient was recommend to be admitted for further evaluation and treatment    11/3  Patient had just returned from imaging. Family member in room also. She states she feels well. No weakness or facial droop. Speech has improved. She has no concerns or questions at this time.  Denies chest pain, shortness of breath, abdominal pain, nausea or vomiting    Current Facility-Administered Medications   Medication Dose Route Frequency    atorvastatin (LIPITOR) tablet 40 mg  40 mg Oral DAILY    aspirin tablet 325 mg  325 mg Oral DAILY    hydrALAZINE (APRESOLINE) tablet 10 mg  10 mg Oral TID    busPIRone (BUSPAR) tablet 10 mg  10 mg Oral TID    losartan (COZAAR) tablet 100 mg  100 mg Oral DAILY    amLODIPine (NORVASC) tablet 10 mg  10 mg Oral DAILY    montelukast (SINGULAIR) tablet 10 mg  10 mg Oral DAILY    cholecalciferol (VITAMIN D3) (5000 Units/125 mcg) tablet 5,000 Units  5,000 Units Oral DAILY    insulin lispro (HUMALOG) injection   SubCUTAneous AC&HS    glucose chewable tablet 16 g  4 Tablet Oral PRN    dextrose (D50W) injection syrg 12.5-25 g  25-50 mL IntraVENous PRN    glucagon (GLUCAGEN) injection 1 mg  1 mg IntraMUSCular PRN    0.9% sodium chloride infusion  25 mL/hr IntraVENous CONTINUOUS    acetaminophen (TYLENOL) tablet 650 mg  650 mg Oral Q6H PRN    Or    acetaminophen (TYLENOL) suppository 650 mg  650 mg Rectal Q6H PRN    polyethylene glycol (MIRALAX) packet 17 g  17 g Oral DAILY PRN    ondansetron (ZOFRAN ODT) tablet 4 mg  4 mg Oral Q8H PRN    Or    ondansetron (ZOFRAN) injection 4 mg  4 mg IntraVENous Q6H PRN    enoxaparin (LOVENOX) injection 40 mg  40 mg SubCUTAneous DAILY          Objective:     Visit Vitals  /66 (BP 1 Location: Right lower arm, BP Patient Position: At rest)   Pulse 60   Temp 97.1 °F (36.2 °C)   Resp 18   Ht 5' 2\" (1.575 m)   Wt 250 lb (113.4 kg)   SpO2 99%   BMI 45.73 kg/m²    O2 Flow Rate (L/min): 2 l/min O2 Device: Nasal cannula    Temp (24hrs), Av.8 °F (36.6 °C), Min:97.1 °F (36.2 °C), Max:98.4 °F (36.9 °C)      No intake/output data recorded. No intake/output data recorded. Physical Exam:   Constitutional: pt is oriented to person, place, and time. HENT:   Head: Normocephalic and atraumatic. Eyes: Pupils are equal, round, and reactive to light. EOM are normal.   Cardiovascular: Normal rate, regular rhythm and normal heart sounds. Pulmonary/Chest: Breath sounds normal. No wheezes. No rales. Exhibits no tenderness. Abdominal: Soft. Bowel sounds are normal. There is no abdominal tenderness. There is no rebound and no guarding. Musculoskeletal: Normal range of motion. Neurological: pt is alert and oriented to person, place, and time. Alert. Normal strength. No cranial nerve deficit or sensory deficit.  Displays a negative Romberg sign.         Data Review    Recent Results (from the past 24 hour(s))   CBC WITH AUTOMATED DIFF    Collection Time: 21  1:35 PM   Result Value Ref Range    WBC 9.9 3.6 - 11.0 K/uL    RBC 5.09 3.80 - 5.20 M/uL    HGB 14.5 11.5 - 16.0 g/dL    HCT 46.5 35.0 - 47.0 %    MCV 91.4 80.0 - 99.0 FL    MCH 28.5 26.0 - 34.0 PG    MCHC 31.2 30.0 - 36.5 g/dL    RDW 14.3 11.5 - 14.5 %    PLATELET 878 (L) 587 - 400 K/uL    MPV 12.9 8.9 - 12.9 FL    NRBC 0.0 0.0  WBC    ABSOLUTE NRBC 0.00 0.00 - 0.01 K/uL    NEUTROPHILS 73 32 - 75 %    LYMPHOCYTES 20 12 - 49 %    MONOCYTES 6 5 - 13 %    EOSINOPHILS 1 0 - 7 %    BASOPHILS 0 0 - 1 %    IMMATURE GRANULOCYTES 0 0 - 0.5 %    ABS. NEUTROPHILS 7.2 1.8 - 8.0 K/UL    ABS. LYMPHOCYTES 2.0 0.8 - 3.5 K/UL    ABS. MONOCYTES 0.6 0.0 - 1.0 K/UL    ABS. EOSINOPHILS 0.1 0.0 - 0.4 K/UL    ABS. BASOPHILS 0.0 0.0 - 0.1 K/UL    ABS. IMM. GRANS. 0.0 0.00 - 0.04 K/UL    DF AUTOMATED     METABOLIC PANEL, COMPREHENSIVE    Collection Time: 11/02/21  1:35 PM   Result Value Ref Range    Sodium 141 136 - 145 mmol/L    Potassium 3.6 3.5 - 5.1 mmol/L    Chloride 110 (H) 97 - 108 mmol/L    CO2 26 21 - 32 mmol/L    Anion gap 5 5 - 15 mmol/L    Glucose 143 (H) 65 - 100 mg/dL    BUN 13 6 - 20 mg/dL    Creatinine 0.66 0.55 - 1.02 mg/dL    BUN/Creatinine ratio 20 12 - 20      GFR est AA >60 >60 ml/min/1.73m2    GFR est non-AA >60 >60 ml/min/1.73m2    Calcium 9.5 8.5 - 10.1 mg/dL    Bilirubin, total 0.8 0.2 - 1.0 mg/dL    AST (SGOT) 16 15 - 37 U/L    ALT (SGPT) 23 12 - 78 U/L    Alk.  phosphatase 77 45 - 117 U/L    Protein, total 7.2 6.4 - 8.2 g/dL    Albumin 3.8 3.5 - 5.0 g/dL    Globulin 3.4 2.0 - 4.0 g/dL    A-G Ratio 1.1 1.1 - 2.2     PROTHROMBIN TIME + INR    Collection Time: 11/02/21  1:35 PM   Result Value Ref Range    Prothrombin time 13.4 11.9 - 14.7 sec    INR 1.0 0.9 - 1.1     PTT    Collection Time: 11/02/21  1:35 PM   Result Value Ref Range    aPTT 23.9 21.2 - 34.1 sec    aPTT, therapeutic range   82 - 109 sec   TROPONIN-HIGH SENSITIVITY    Collection Time: 11/02/21  1:35 PM   Result Value Ref Range    Troponin-High Sensitivity 17 0 - 51 ng/L   URINALYSIS W/MICROSCOPIC    Collection Time: 11/02/21  4:45 PM   Result Value Ref Range    Color Yellow/Straw      Appearance Turbid (A) Clear      Specific gravity 1.008 1.003 - 1.030      pH (UA) 8.0 5.0 - 8.0      Protein Negative Negative mg/dL    Glucose Negative Negative mg/dL    Ketone Negative Negative mg/dL    Bilirubin Negative Negative      Blood Negative Negative      Urobilinogen 0.1 0.1 - 1.0 EU/dL    Nitrites Negative Negative      Leukocyte Esterase Small (A) Negative WBC 0-4 0 - 4 /hpf    RBC 0-5 0 - 5 /hpf    Bacteria Negative Negative /hpf   ECHO ADULT COMPLETE    Collection Time: 11/02/21  6:21 PM   Result Value Ref Range    LV ED Vol A4C 119.00 cm3    LV ED Vol A2C 143.00 cm3    LV ES Vol A4C 43.00 cm3    LV ES Vol A2C 43.20 cm3    IVSd 0.86 0.6 - 0.9 cm    LVIDd 5.23 3.9 - 5.3 cm    LVIDs 3.51 cm    LVOT Peak Velocity 106.00 cm/s    LVOT Peak Gradient 4.00 mmHg    LVPWd 0.62 0.6 - 0.9 cm    LV E' Septal Velocity 5.95 cm/s    E/E' lateral 8.70     E/E' septal 9.60     Left Atrium Major Axis 5.01 cm    LA Area 4C 17.40 cm2    LA Volume DISK BP 50.00 22 - 52 cm3    Left Atrium Major Axis 3.80 cm    Left Atrium Major Axis 3.80 cm    LA Vol Index 23.90 16 - 28 ml/m2    Aortic Valve Systolic Peak Velocity 834.41 cm/s    AoV PG 6.00 mmHg    Ao Root D 3.00 cm    AO ASC D 2.70 cm    MR Peak Gradient 31.00 mmHg    Mitral Valve Max Velocity 89.60 cm/s    MV Peak Gradient 3.00 mmHg    Mitral Valve Deceleration [place] 1,290.00 mm/s2    Mitral Valve Deceleration [place] 1,290.00 mm/s2    Mitral Valve E Wave Deceleration Time 264.00 ms    Mitral Valve Pressure Half-time 170.00 ms    MV A Fernando 75.60 cm/s    MV E Fernando 57.10 cm/s    MV Mean Gradient 2.00 mmHg    Mitral Valve Annulus Velocity Time Integral 31.10 cm    MVA (PHT) 1.29 cm2    MV E/A 0.80     Pulmonic Valve Systolic Mean Gradient 5.39 mmHg    Pulmonic Valve Systolic Velocity Time Integral 20.20 cm    Pulmonic Valve Max Velocity 107.00 cm/s    Pulmonic Valve Systolic Peak Instantaneous Gradient 5.00 mmHg    P Vein A Dur 88.00 ms    Pulmonary Vein \"A\" Wave Velocity 32.10 cm/s    Est. RA Pressure 8.00 mmHg    RVIDd 3.76 cm    RVSP 38.00 mmHg    Tricuspid Valve Max Velocity 274.00 cm/s    Triscuspid Valve Regurgitation Peak Gradient 30.00 mmHg    Tapse 1.60 1.5 - 2.0 cm    Right Atrial Area 4C 14.00 cm2   GLUCOSE, POC    Collection Time: 11/02/21  8:53 PM   Result Value Ref Range    Glucose (POC) 142 (H) 65 - 117 mg/dL    Performed by Alexx Master    METABOLIC PANEL, COMPREHENSIVE    Collection Time: 11/03/21  7:13 AM   Result Value Ref Range    Sodium 143 136 - 145 mmol/L    Potassium 3.8 3.5 - 5.1 mmol/L    Chloride 110 (H) 97 - 108 mmol/L    CO2 29 21 - 32 mmol/L    Anion gap 4 (L) 5 - 15 mmol/L    Glucose 117 (H) 65 - 100 mg/dL    BUN 11 6 - 20 mg/dL    Creatinine 0.55 0.55 - 1.02 mg/dL    BUN/Creatinine ratio 20 12 - 20      GFR est AA >60 >60 ml/min/1.73m2    GFR est non-AA >60 >60 ml/min/1.73m2    Calcium 8.8 8.5 - 10.1 mg/dL    Bilirubin, total 0.5 0.2 - 1.0 mg/dL    AST (SGOT) 15 15 - 37 U/L    ALT (SGPT) 20 12 - 78 U/L    Alk. phosphatase 67 45 - 117 U/L    Protein, total 6.2 (L) 6.4 - 8.2 g/dL    Albumin 3.1 (L) 3.5 - 5.0 g/dL    Globulin 3.1 2.0 - 4.0 g/dL    A-G Ratio 1.0 (L) 1.1 - 2.2     CBC WITH AUTOMATED DIFF    Collection Time: 11/03/21  7:13 AM   Result Value Ref Range    WBC 7.7 3.6 - 11.0 K/uL    RBC 4.84 3.80 - 5.20 M/uL    HGB 13.9 11.5 - 16.0 g/dL    HCT 44.0 35.0 - 47.0 %    MCV 90.9 80.0 - 99.0 FL    MCH 28.7 26.0 - 34.0 PG    MCHC 31.6 30.0 - 36.5 g/dL    RDW 14.3 11.5 - 14.5 %    PLATELET 964 414 - 635 K/uL    MPV 12.3 8.9 - 12.9 FL    NRBC 0.0 0.0  WBC    ABSOLUTE NRBC 0.00 0.00 - 0.01 K/uL    NEUTROPHILS 56 32 - 75 %    LYMPHOCYTES 33 12 - 49 %    MONOCYTES 8 5 - 13 %    EOSINOPHILS 2 0 - 7 %    BASOPHILS 1 0 - 1 %    IMMATURE GRANULOCYTES 0 0 - 0.5 %    ABS. NEUTROPHILS 4.3 1.8 - 8.0 K/UL    ABS. LYMPHOCYTES 2.5 0.8 - 3.5 K/UL    ABS. MONOCYTES 0.6 0.0 - 1.0 K/UL    ABS. EOSINOPHILS 0.2 0.0 - 0.4 K/UL    ABS. BASOPHILS 0.0 0.0 - 0.1 K/UL    ABS. IMM.  GRANS. 0.0 0.00 - 0.04 K/UL    DF AUTOMATED     GLUCOSE, POC    Collection Time: 11/03/21  7:50 AM   Result Value Ref Range    Glucose (POC) 111 65 - 117 mg/dL    Performed by [address]    Collection Time: 11/03/21 11:02 AM   Result Value Ref Range    Left CCA dist sys 75.7 cm/s    Left CCA dist [place] 12.9 cm/s    Left CCA prox sys 104.0 cm/s    Left CCA prox [place] 21.5 cm/s    Left ICA dist sys 86.5 cm/s    Left ICA dist [place] 33.8 cm/s    Left ICA prox sys 48.9 cm/s    Left ICA prox [place] 19.9 cm/s    Left ECA sys 53.2 cm/s    LEFT EXTERNAL CAROTID ARTERY D 10.20 cm/s    Left subclavian prox PSV 83.2 cm/s    Left subclavian prox EDV 0.0 cm/s    Right cca dist sys 53.7 cm/s    Right CCA dist ramsey 15.0 cm/s    Right CCA prox sys 104.0 cm/s    Right CCA prox ramsey 15.5 cm/s    Right ICA dist sys 58.0 cm/s    Right ICA dist ramsey 21.1 cm/s    Right ICA prox sys 49.8 cm/s    Right ICA prox ramsey 13.3 cm/s    Right eca sys 80.6 cm/s    RIGHT EXTERNAL CAROTID ARTERY D 12.90 cm/s    Right subclavian prox .0 cm/s    Right subclavian prox EDV 0.0 cm/s    Right vertebral sys 42.1 cm/s    RIGHT VERTEBRAL ARTERY D 12.00 cm/s         Assessment/Plan:     TIA  Diabetes mellitus  Hypertension  CHF  Operative sleep apnea     1. TIA   Per Neuro   Episodic Facial Droop, Speech Difficulties, Likely Related To Recrudescence of Left Parietal Ischemia vs TIA   -Q6Hr NeuroChecks, TELE  -Stroke Prophylaxis: , Atorvastatin 40   -VTE Prophylaxis: Enoxaparin 40 daily  -Glucose Goal < 180  -Head of Bed at 30 degrees  -SBP Goal < 160  MRI Brain WO, CUS, TTE, HgbA1c, Lipid Panel--PENDING  CT of head shows old stroke, but no acute findings    2. Hypertension   -well controlled   -103/66 today    - continue amlodipine, hydralazine and losartan     3.  Diabetes    -continue humalog     PT/OT consult  Speech following

## 2021-11-03 NOTE — PROGRESS NOTES
Reason for Admission:  TIA                     RUR Score:          7%           Plan for utilizing home health:      Agrees if needed    PCP: First and Last name:  MELY Hitchcock     Name of Practice:    Are you a current patient: Yes/No: yes   Approximate date of last visit: 3 months   Can you participate in a virtual visit with your PCP:                     Current Advanced Directive/Advance Care Plan: Full Code      Healthcare Decision Maker:   Click here to complete [address] including selection of the Healthcare Decision Maker Relationship (ie \"Primary\")           Watson Hernandez Commander [phone]                  Transition of Care Plan:                      CM met with patient and patient's boyfriend at bedside. Patient lives in a first level apartment alone. Patient does not use any DME. She does have a CPAP machine that she uses at night time. Current disposition is home/self.

## 2021-11-04 VITALS
DIASTOLIC BLOOD PRESSURE: 59 MMHG | OXYGEN SATURATION: 95 % | WEIGHT: 250 LBS | HEIGHT: 62 IN | TEMPERATURE: 97.4 F | HEART RATE: 65 BPM | SYSTOLIC BLOOD PRESSURE: 111 MMHG | BODY MASS INDEX: 46.01 KG/M2 | RESPIRATION RATE: 20 BRPM

## 2021-11-04 LAB
GLUCOSE BLD STRIP.AUTO-MCNC: 106 MG/DL (ref 65–117)
GLUCOSE BLD STRIP.AUTO-MCNC: 93 MG/DL (ref 65–117)
PERFORMED BY, TECHID: NORMAL
PERFORMED BY, TECHID: NORMAL

## 2021-11-04 PROCEDURE — 82962 GLUCOSE BLOOD TEST: CPT

## 2021-11-04 PROCEDURE — 74011250637 HC RX REV CODE- 250/637: Performed by: FAMILY MEDICINE

## 2021-11-04 PROCEDURE — 96372 THER/PROPH/DIAG INJ SC/IM: CPT

## 2021-11-04 PROCEDURE — 74011250636 HC RX REV CODE- 250/636: Performed by: FAMILY MEDICINE

## 2021-11-04 PROCEDURE — G0378 HOSPITAL OBSERVATION PER HR: HCPCS

## 2021-11-04 PROCEDURE — 77010033678 HC OXYGEN DAILY

## 2021-11-04 PROCEDURE — 97161 PT EVAL LOW COMPLEX 20 MIN: CPT

## 2021-11-04 PROCEDURE — 74011250637 HC RX REV CODE- 250/637: Performed by: STUDENT IN AN ORGANIZED HEALTH CARE EDUCATION/TRAINING PROGRAM

## 2021-11-04 PROCEDURE — 97110 THERAPEUTIC EXERCISES: CPT

## 2021-11-04 PROCEDURE — 94760 N-INVAS EAR/PLS OXIMETRY 1: CPT

## 2021-11-04 RX ADMIN — Medication 5000 UNITS: at 08:58

## 2021-11-04 RX ADMIN — BUSPIRONE HYDROCHLORIDE 10 MG: 10 TABLET ORAL at 08:58

## 2021-11-04 RX ADMIN — ATORVASTATIN CALCIUM 40 MG: 40 TABLET, FILM COATED ORAL at 08:58

## 2021-11-04 RX ADMIN — ASPIRIN 325 MG ORAL TABLET 325 MG: 325 PILL ORAL at 08:58

## 2021-11-04 RX ADMIN — AMLODIPINE BESYLATE 10 MG: 5 TABLET ORAL at 08:58

## 2021-11-04 RX ADMIN — MONTELUKAST 10 MG: 10 TABLET, FILM COATED ORAL at 08:58

## 2021-11-04 RX ADMIN — ENOXAPARIN SODIUM 40 MG: 100 INJECTION SUBCUTANEOUS at 09:02

## 2021-11-04 RX ADMIN — HYDRALAZINE HYDROCHLORIDE 10 MG: 10 TABLET, FILM COATED ORAL at 08:58

## 2021-11-04 RX ADMIN — LOSARTAN POTASSIUM 100 MG: 50 TABLET, FILM COATED ORAL at 08:58

## 2021-11-04 NOTE — PROGRESS NOTES
Hospitalist Progress Note    Subjective:   Daily Progress Note: 11/4/2021 8:56 AM       Patient is a 60 y.o. year old female with past medical history of hypertension diabetes congestive heart failure obstructive sleep apnea came to emergency room because of slurred speech patient was talking to her landlord to pay her rent early this morning and the landlord noticed she have trouble speaking. The patient was sent to the ER seen by the ER physician CT scan of the head done shows old stroke patient was recommend to be admitted for further evaluation and treatment      11/4  Patient seen sitting up in bed eating breakfast. She states she feels well today with no complaints or concerns. No acute distress. No facial droop or weakness. No difficulty swallowing or with speech.  Denies chest pain, shortness of breath, abdominal pain, nausea or vomiting     Current Facility-Administered Medications   Medication Dose Route Frequency    atorvastatin (LIPITOR) tablet 40 mg  40 mg Oral DAILY    aspirin tablet 325 mg  325 mg Oral DAILY    hydrALAZINE (APRESOLINE) tablet 10 mg  10 mg Oral TID    busPIRone (BUSPAR) tablet 10 mg  10 mg Oral TID    losartan (COZAAR) tablet 100 mg  100 mg Oral DAILY    amLODIPine (NORVASC) tablet 10 mg  10 mg Oral DAILY    montelukast (SINGULAIR) tablet 10 mg  10 mg Oral DAILY    cholecalciferol (VITAMIN D3) (5000 Units/125 mcg) tablet 5,000 Units  5,000 Units Oral DAILY    insulin lispro (HUMALOG) injection   SubCUTAneous AC&HS    glucose chewable tablet 16 g  4 Tablet Oral PRN    dextrose (D50W) injection syrg 12.5-25 g  25-50 mL IntraVENous PRN    glucagon (GLUCAGEN) injection 1 mg  1 mg IntraMUSCular PRN    0.9% sodium chloride infusion  25 mL/hr IntraVENous CONTINUOUS    acetaminophen (TYLENOL) tablet 650 mg  650 mg Oral Q6H PRN    Or    acetaminophen (TYLENOL) suppository 650 mg  650 mg Rectal Q6H PRN    polyethylene glycol (MIRALAX) packet 17 g  17 g Oral DAILY PRN    ondansetron (ZOFRAN ODT) tablet 4 mg  4 mg Oral Q8H PRN    Or    ondansetron (ZOFRAN) injection 4 mg  4 mg IntraVENous Q6H PRN    enoxaparin (LOVENOX) injection 40 mg  40 mg SubCUTAneous DAILY          Objective:     Visit Vitals  BP (!) 111/59 (BP 1 Location: Left upper arm, BP Patient Position: At rest)   Pulse 65   Temp 97.4 °F (36.3 °C)   Resp 20   Ht 5' 2\" (1.575 m)   Wt 113.4 kg (250 lb)   SpO2 95%   BMI 45.73 kg/m²    O2 Flow Rate (L/min): 2 l/min O2 Device: Nasal cannula    Temp (24hrs), Av.7 °F (36.5 °C), Min:97.3 °F (36.3 °C), Max:98 °F (36.7 °C)      No intake/output data recorded. No intake/output data recorded. Physical Exam:   Constitutional: pt is oriented to person, place, and time. HENT:   Head: Normocephalic and atraumatic. Eyes: Pupils are equal, round, and reactive to light. EOM are normal.   Cardiovascular: Normal rate, regular rhythm and normal heart sounds. Pulmonary/Chest: Breath sounds normal. No wheezes. No rales. Exhibits no tenderness. Abdominal: Soft. Bowel sounds are normal. There is no abdominal tenderness. There is no rebound and no guarding. Musculoskeletal: Normal range of motion. Neurological: pt is alert and oriented to person, place, and time. Alert. Normal strength. No cranial nerve deficit or sensory deficit.  Displays a negative Romberg sign.         Data Review    Recent Results (from the past 24 hour(s))   GLUCOSE, POC    Collection Time: 21  5:22 PM   Result Value Ref Range    Glucose (POC) 78 65 - 117 mg/dL    Performed by [address] [place] [address], POC    Collection Time: 21  8:38 PM   Result Value Ref Range    Glucose (POC) 164 (H) 65 - 117 mg/dL    Performed by Ethan Taylor, POC    Collection Time: 21  6:51 AM   Result Value Ref Range    Glucose (POC) 106 65 - 117 mg/dL    Performed by Claire CARROLL, POC    Collection Time: 21 11:32 AM   Result Value Ref Range    Glucose (POC) 93 65 - 117 mg/dL Performed by Norm Fleeting          Assessment/Plan:     TIA  Diabetes mellitus  Hypertension  CHF  Operative sleep apnea     1. TIA   Per Neuro   Episodic Facial Droop, Speech Difficulties, Likely Related To Recrudescence of Left Parietal Ischemia vs TIA   -Q6Hr NeuroChecks, TELE  -Stroke Prophylaxis: , Atorvastatin 40   -VTE Prophylaxis: Enoxaparin 40 daily  -Glucose Goal < 180  -Head of Bed at 30 degrees  -SBP Goal < 160    CT of head shows old stroke, but no acute findings  MRI Brain WO- no acute or subacute ischemia ,   CUS- no significant findings    HgbA1c- increased at 6.5  Lipid Panel- all within normal limits    Echo  · LV: Calculated LVEF is 61%. Normal cavity size, wall thickness, systolic function (ejection fraction normal) and diastolic function. · MV: Mild mitral annular calcification. Mild mitral valve regurgitation is present. · AV: Aortic valve leaflet calcification present. · IVC: Moderately elevated central venous pressure (8 mmHg); IVC diameter is larger than 21 mm and collapses more than 50% with respiration. Will continue patient on antiplatelet statin therapy for neurovascular prophylaxis. No further neurologic workup is necessary at the current time.     2. Hypertension              -well controlled              -103/66 today               - continue amlodipine, hydralazine and losartan      3.  Diabetes               -continue humalog      PT/OT consult  Speech following   Clear for discharge -continue statin and antiplatelets with discharge

## 2021-11-04 NOTE — DISCHARGE INSTRUCTIONS
Discharge Instructions       PATIENT ID: Harsh Cabrera  MRN: [id number]   YOB: 1960    DATE OF ADMISSION: 11/2/2021  1:18 PM    DATE OF DISCHARGE: 11/4/2021    PRIMARY CARE PROVIDER: MELY Urbina     ATTENDING PHYSICIAN: Lavonne Frankel, MD  DISCHARGING PROVIDER: Angie Poole MD    To contact this individual call [phone] and ask the  to page. If unavailable ask to be transferred the Adult Hospitalist Department. DISCHARGE DIAGNOSES tia    CONSULTATIONS: IP CONSULT TO NEUROLOGY    PROCEDURES/SURGERIES: * No surgery found *    PENDING TEST RESULTS:   At the time of discharge the following test results are still pending: None    FOLLOW UP APPOINTMENTS:   Follow-up Information     Follow up With Specialties Details Why Hermilo Landeros MD Family Medicine In 1 week  [address]  [phone]             ADDITIONAL CARE RECOMMENDATIONS: None    DIET: Cardiac Diet      ACTIVITY: Activity as tolerated    Wound care: Wound Care Order: submitted to [facility] Please view [email or web address]    EQUIPMENT needed: None      DISCHARGE MEDICATIONS:   See Medication Reconciliation Form    · It is important that you take the medication exactly as they are prescribed. · Keep your medication in the bottles provided by the pharmacist and keep a list of the medication names, dosages, and times to be taken in your wallet. · Do not take other medications without consulting your doctor. NOTIFY YOUR PHYSICIAN FOR ANY OF THE FOLLOWING:   Fever over 101 degrees for 24 hours. Chest pain, shortness of breath, fever, chills, nausea, vomiting, diarrhea, change in mentation, falling, weakness, bleeding. Severe pain or pain not relieved by medications. Or, any other signs or symptoms that you may have questions about.       DISPOSITION:    Home With:   OT  PT  [facility]  RN       SNF/Inpatient Rehab/LTAC Independent/assisted living    Hospice    Other:         PROBLEM LIST Updated:  Yes ***       Signed:   Kathy Weber MD  11/4/2021  1:43 PM     Discharge Instructions       PATIENT ID: Rebecca Scales  MRN: [id number]   YOB: 1960    DATE OF ADMISSION: 11/2/2021  1:18 PM    DATE OF DISCHARGE: 11/4/2021    PRIMARY CARE PROVIDER: MELY Champagne     ATTENDING PHYSICIAN: Neelam Larson MD  DISCHARGING PROVIDER: Kathy Weber MD    To contact this individual call [phone] and ask the  to page. If unavailable ask to be transferred the Adult Hospitalist Department. DISCHARGE DIAGNOSES ***    CONSULTATIONS: IP CONSULT TO NEUROLOGY    PROCEDURES/SURGERIES: * No surgery found *    PENDING TEST RESULTS:   At the time of discharge the following test results are still pending: ***    FOLLOW UP APPOINTMENTS:   Follow-up Information     Follow up With Specialties Details Why Lazarus Kingfisher, MD Family Medicine In 1 week  [address]  [phone]             ADDITIONAL CARE RECOMMENDATIONS: ***    DIET: {diet:31534}      ACTIVITY: {discharge activity:09297}    Wound care: {[facility] Wound Care Instructions:68880}    EQUIPMENT needed: ***      DISCHARGE MEDICATIONS:   See Medication Reconciliation Form    · It is important that you take the medication exactly as they are prescribed. · Keep your medication in the bottles provided by the pharmacist and keep a list of the medication names, dosages, and times to be taken in your wallet. · Do not take other medications without consulting your doctor. NOTIFY YOUR PHYSICIAN FOR ANY OF THE FOLLOWING:   Fever over 101 degrees for 24 hours. Chest pain, shortness of breath, fever, chills, nausea, vomiting, diarrhea, change in mentation, falling, weakness, bleeding. Severe pain or pain not relieved by medications.   Or, any other signs or symptoms that you may have questions about.      DISPOSITION:    Home With:   OT  PT  HH  RN       SNF/Inpatient Rehab/LTAC    Independent/assisted living    Hospice    Other:         PROBLEM LIST Updated:  Yes ***       Signed:   Malika Cerda MD  11/4/2021  1:43 PM

## 2021-11-04 NOTE — PROGRESS NOTES
Hospitalist Progress Note    Subjective:   Daily Progress Note: 11/4/2021 8:56 AM       Patient is a 60 y.o. year old female with past medical history of hypertension diabetes congestive heart failure obstructive sleep apnea came to emergency room because of slurred speech patient was talking to her landlord to pay her rent early this morning and the landlord noticed she have trouble speaking. The patient was sent to the ER seen by the ER physician CT scan of the head done shows old stroke patient was recommend to be admitted for further evaluation and treatment      11/4  Patient seen sitting up in bed eating breakfast. She states she feels well today with no complaints or concerns. No acute distress. No facial droop or weakness. No difficulty swallowing or with speech.  Denies chest pain, shortness of breath, abdominal pain, nausea or vomiting     Current Facility-Administered Medications   Medication Dose Route Frequency    atorvastatin (LIPITOR) tablet 40 mg  40 mg Oral DAILY    aspirin tablet 325 mg  325 mg Oral DAILY    hydrALAZINE (APRESOLINE) tablet 10 mg  10 mg Oral TID    busPIRone (BUSPAR) tablet 10 mg  10 mg Oral TID    losartan (COZAAR) tablet 100 mg  100 mg Oral DAILY    amLODIPine (NORVASC) tablet 10 mg  10 mg Oral DAILY    montelukast (SINGULAIR) tablet 10 mg  10 mg Oral DAILY    cholecalciferol (VITAMIN D3) (5000 Units/125 mcg) tablet 5,000 Units  5,000 Units Oral DAILY    insulin lispro (HUMALOG) injection   SubCUTAneous AC&HS    glucose chewable tablet 16 g  4 Tablet Oral PRN    dextrose (D50W) injection syrg 12.5-25 g  25-50 mL IntraVENous PRN    glucagon (GLUCAGEN) injection 1 mg  1 mg IntraMUSCular PRN    0.9% sodium chloride infusion  25 mL/hr IntraVENous CONTINUOUS    acetaminophen (TYLENOL) tablet 650 mg  650 mg Oral Q6H PRN    Or    acetaminophen (TYLENOL) suppository 650 mg  650 mg Rectal Q6H PRN    polyethylene glycol (MIRALAX) packet 17 g  17 g Oral DAILY PRN    ondansetron (ZOFRAN ODT) tablet 4 mg  4 mg Oral Q8H PRN    Or    ondansetron (ZOFRAN) injection 4 mg  4 mg IntraVENous Q6H PRN    enoxaparin (LOVENOX) injection 40 mg  40 mg SubCUTAneous DAILY          Objective:     Visit Vitals  /67 (BP 1 Location: Left upper arm, BP Patient Position: At rest)   Pulse (!) 57 Comment: reported to primary nurse   Temp 98 °F (36.7 °C)   Resp 20   Ht 5' 2\" (1.575 m)   Wt 250 lb (113.4 kg)   SpO2 99%   BMI 45.73 kg/m²    O2 Flow Rate (L/min): 2 l/min O2 Device: Nasal cannula    Temp (24hrs), Av.7 °F (36.5 °C), Min:97.3 °F (36.3 °C), Max:98 °F (36.7 °C)      No intake/output data recorded. No intake/output data recorded. Physical Exam:   Constitutional: pt is oriented to person, place, and time. HENT:   Head: Normocephalic and atraumatic. Eyes: Pupils are equal, round, and reactive to light. EOM are normal.   Cardiovascular: Normal rate, regular rhythm and normal heart sounds. Pulmonary/Chest: Breath sounds normal. No wheezes. No rales. Exhibits no tenderness. Abdominal: Soft. Bowel sounds are normal. There is no abdominal tenderness. There is no rebound and no guarding. Musculoskeletal: Normal range of motion. Neurological: pt is alert and oriented to person, place, and time. Alert. Normal strength. No cranial nerve deficit or sensory deficit.  Displays a negative Romberg sign.         Data Review    Recent Results (from the past 24 hour(s))   ECHO ADULT COMPLETE    Collection Time: 21 10:03 AM   Result Value Ref Range    LV ED Vol A4C 119.00 cm3    LV ED Vol A2C 143.00 cm3    LV ES Vol A4C 43.00 cm3    LV ES Vol A2C 43.20 cm3    IVSd 0.86 0.6 - 0.9 cm    LVIDd 5.23 3.9 - 5.3 cm    LVIDs 3.51 cm    LVOT Peak Velocity 106.00 cm/s    LVOT Peak Gradient 4.00 mmHg    LVPWd 0.62 0.6 - 0.9 cm    LV E' Septal Velocity 5.95 cm/s    E/E' septal 9.60     Left Atrium Major Axis 3.80 cm    LA Area 4C 17.40 cm2    LA Volume DISK BP 50.00 22 - 52 cm3    Aortic Valve Systolic Peak Velocity 208.04 cm/s    AoV PG 6.00 mmHg    Ao Root D 3.00 cm    AO ASC D 2.70 cm    Mitral Valve Max Velocity 89.60 cm/s    MV Peak Gradient 3.00 mmHg    Mitral Valve Deceleration Divide 1,290.00 mm/s2    Mitral Valve Deceleration Divide 1,290.00 mm/s2    Mitral Valve E Wave Deceleration Time 264.00 ms    Mitral Valve Pressure Half-time 170.00 ms    MV A Fernando 75.60 cm/s    MV E Fernando 57.10 cm/s    MV Mean Gradient 2.00 mmHg    Mitral Valve Annulus Velocity Time Integral 31.10 cm    MVA (PHT) 1.29 cm2    MV E/A 0.76     Pulmonic Valve Systolic Mean Gradient 5.38 mmHg    Pulmonic Valve Systolic Velocity Time Integral 20.20 cm    Pulmonic Valve Max Velocity 107.00 cm/s    Pulmonic Valve Systolic Peak Instantaneous Gradient 5.00 mmHg    P Vein A Dur 88.00 ms    Pulmonary Vein \"A\" Wave Velocity 32.10 cm/s    Est. RA Pressure 8.00 mmHg    RVIDd 3.76 cm    RVSP 38.00 mmHg    Tricuspid Valve Max Velocity 274.00 cm/s    Triscuspid Valve Regurgitation Peak Gradient 30.00 mmHg    Tapse 1.60 1.5 - 2.0 cm    Right Atrial Area 4C 14.00 cm2    BP EF 60.9 55 - 100 %    LV Ejection Fraction MOD 4C 64.0 %    LV Mass .9 67 - 162 g    LV Mass AL Index 63.3 43 - 95 g/m2    Left Atrium Minor Axis 1.81 cm   DUPLEX CAROTID BILATERAL    Collection Time: 11/03/21 11:02 AM   Result Value Ref Range    Left CCA dist sys 75.7 cm/s    Left CCA dist ramsey 12.9 cm/s    Left CCA prox sys 104.0 cm/s    Left CCA prox ramsey 21.5 cm/s    Left ICA dist sys 86.5 cm/s    Left ICA dist ramsey 33.8 cm/s    Left ICA prox sys 48.9 cm/s    Left ICA prox ramsey 19.9 cm/s    Left ECA sys 53.2 cm/s    LEFT EXTERNAL CAROTID ARTERY D 10.20 cm/s    Left subclavian prox PSV 83.2 cm/s    Left subclavian prox EDV 0.0 cm/s    Right cca dist sys 53.7 cm/s    Right CCA dist ramsey 15.0 cm/s    Right CCA prox sys 104.0 cm/s    Right CCA prox ramsey 15.5 cm/s    Right ICA dist sys 58.0 cm/s    Right ICA dist ramsey 21.1 cm/s    Right ICA prox sys 49.8 cm/s    Right ICA prox ramsey 13.3 cm/s    Right eca sys 80.6 cm/s    RIGHT EXTERNAL CAROTID ARTERY D 12.90 cm/s    Right subclavian prox .0 cm/s    Right subclavian prox EDV 0.0 cm/s    Right vertebral sys 42.1 cm/s    RIGHT VERTEBRAL ARTERY D 12.00 cm/s    Left arm  mmHg    Right arm  mmHg    Right ICA/CCA sys 0.93     Left ICA/CCA sys 0.65    GLUCOSE, POC    Collection Time: 11/03/21 11:11 AM   Result Value Ref Range    Glucose (POC) 128 (H) 65 - 117 mg/dL    Performed by Paulina Acuna    GLUCOSE, POC    Collection Time: 11/03/21  5:22 PM   Result Value Ref Range    Glucose (POC) 78 65 - 117 mg/dL    Performed by [address], POC    Collection Time: 11/03/21  8:38 PM   Result Value Ref Range    Glucose (POC) 164 (H) 65 - 117 mg/dL    Performed by Yann Roa    GLUCOSE, POC    Collection Time: 11/04/21  6:51 AM   Result Value Ref Range    Glucose (POC) 106 65 - 117 mg/dL    Performed by Becki Quezada          Assessment/Plan:     TIA  Diabetes mellitus  Hypertension  CHF  Operative sleep apnea     1. TIA   Per Neuro   Episodic Facial Droop, Speech Difficulties, Likely Related To Recrudescence of Left Parietal Ischemia vs TIA   -Q6Hr NeuroChecks, TELE  -Stroke Prophylaxis: , Atorvastatin 40   -VTE Prophylaxis: Enoxaparin 40 daily  -Glucose Goal < 180  -Head of Bed at 30 degrees  -SBP Goal < 160    CT of head shows old stroke, but no acute findings  MRI Brain WO- no acute or subacute ischemia ,   CUS- no significant findings    HgbA1c- increased at 6.5  Lipid Panel- all within normal limits    Echo  · LV: Calculated LVEF is 61%. Normal cavity size, wall thickness, systolic function (ejection fraction normal) and diastolic function. · MV: Mild mitral annular calcification. Mild mitral valve regurgitation is present. · AV: Aortic valve leaflet calcification present.   · IVC: Moderately elevated central venous pressure (8 mmHg); IVC diameter is larger than 21 mm and collapses more than 50% with respiration. Will continue patient on antiplatelet statin therapy for neurovascular prophylaxis. No further neurologic workup is necessary at the current time.     2. Hypertension              -well controlled              -103/66 today               - continue amlodipine, hydralazine and losartan      3.  Diabetes               -continue humalog      PT/OT consult  Speech following   Clear for discharge -continue statin and antiplatelets with discharge

## 2021-11-04 NOTE — PROGRESS NOTES
PHYSICAL THERAPY EVALUATION  Patient: Brian Mendez (89 y.o. female)  Date: 11/4/2021  Primary Diagnosis: TIA (transient ischemic attack) [G45.9]       Precautions: falls    ASSESSMENT  This is a 65 y/o female who  came to  [facility]  ED with c/o slurred speech . Patient was talking to her landlord to pay her rent early this morning and the landlord noticed she have trouble speaking . Pt placed under observation  on 11/4/21 for TIA . CT scan of the head  shows old stroke. MRI of the brain  on 11/03 revealed no acute or subacute ischemia but did reveal the remote left parietal infarct. Pls see PMH below . Pt reports to be on 2L O2 at baseline . Pt received semi-supine in bed , agreeable for PT eval/tx . Pt is A& O x 4 ,  per pt report , she lives alone in a apartment on first floor  with no steps to enter ,was IND with ADL's and IND for functional transfers/mobility with no AD prior to admission . Based on the objective data described below , currently patient on 2 L O2 , is mod I for bed mobility , intact static sitting balance , mod I with all functional transfers , intact static standing balance and is able to ambulate ~250' without AD independently and no instances of LOB . Pt is currently at her functional baseline with no acute PT needs at this time . Pt educated about HEP for b/l LE to build up endurance . Recommend d/c to home independently when medically appropriate . Current Level of Function Impacting Discharge (mobility/balance): Pt is IND with transfers/mobility    Functional Outcome Measure: The patient scored 24 on the AM PAC basic mobility outcome measure which is indicative of low complexity.       Other factors to consider for discharge: none        PLAN :  Recommendation for discharge: (in order for the patient to meet his/her long term goals)  [id number]    This discharge recommendation:  Has been made in collaboration with the attending provider and/or case management    IF patient discharges home will need the following DME: none         SUBJECTIVE:   Patient stated my speech is normal now    OBJECTIVE DATA SUMMARY:   HISTORY:    Past Medical History:   Diagnosis Date    Asthma     Chronic obstructive pulmonary disease (HCC)     Congestive heart failure, unspecified     [facility] [address]     High cholesterol     Morbid obesity ([facility] [address]     Sleep apnea      Past Surgical History:   Procedure Laterality Date    HX GYN      partial hysterectomy       Personal factors and/or comorbidities impacting plan of care:     Home Situation  Home Environment: Apartment  # Steps to Enter: 0  One/Two Story Residence: One story  Living Alone: Yes  Support Systems: Child(kalee)  Patient Expects to be Discharged to[de-identified] Apartment  Current DME Used/Available at Home: None    PLOF: Pt IND for ADLS/IADLS, IND with mobility prior to admission. EXAMINATION/PRESENTATION/DECISION MAKING:   Critical Behavior:  Neurologic State: Alert  Orientation Level: Oriented X4  Cognition: Follows commands     Hearing:     Skin: intact where exposed    Range Of Motion:  AROM: Within functional limits  Strength:    Strength: Within functional limits    Tone & Sensation:   Tone: Normal  Sensation: Intact    Coordination:  Coordination: Within functional limits  Vision:      Functional Mobility:  Bed Mobility:  Rolling: Modified independent  Supine to Sit: Modified independent  Sit to Supine: Modified independent  Scooting: Modified independent  Transfers:  Sit to Stand: Modified independent  Stand to Sit: Modified independent        Bed to Chair: Independent  Balance:   Sitting: Intact; Without support  Standing: Intact;  Without support  Ambulation/Gait Training:  Distance (ft): 250 Feet (ft)  Assistive Device: Gait belt  Ambulation - Level of Assistance: Independent    Functional Measure:    [facility] AM-PAC 6 Clicks         Basic Mobility Inpatient Short Form  How much difficulty does the patient currently have... Unable A Lot A Little None   1. Turning over in bed (including adjusting bedclothes, sheets and blankets)? [] 1   [] 2   [] 3   [x] 4   2. Sitting down on and standing up from a chair with arms ( e.g., wheelchair, bedside commode, etc.)   [] 1   [] 2   [] 3   [x] 4   3. Moving from lying on back to sitting on the side of the bed? [] 1   [] 2   [] 3   [x] 4          How much help from another person does the patient currently need. .. Total A Lot A Little None   4. Moving to and from a bed to a chair (including a wheelchair)? [] 1   [] 2   [] 3   [x] 4   5. Need to walk in hospital room? [] 1   [] 2   [] 3   [x] 4   6. Climbing 3-5 steps with a railing? [] 1   [] 2   [] 3   [x] 4   © , Trustees of [address] [place] [address] [address] [address], under license to [employer]. All rights reserved     Score:  Initial:24 Most Recent: X (Date: 21-- )   Interpretation of Tool:  Represents activities that are increasingly more difficult (i.e. Bed mobility, Transfers, Gait). Score 24 23 22-20 19-15 14-10 9-7 6   Modifier CH CI CJ CK CL CM CN          Physical Therapy Evaluation Charge Determination   History Examination Presentation Decision-Making   MEDIUM  Complexity : 1-2 comorbidities / personal factors will impact the outcome/ POC  LOW Complexity : 1-2 Standardized tests and measures addressing body structure, function, activity limitation and / or participation in recreation  LOW Complexity : Stable, uncomplicated  Other Functional Measure [facility] 6 low complexity      Based on the above components, the patient evaluation is determined to be of the following complexity level: LOW     Pain Ratin/10    Activity Tolerance: WNL  Please refer to the flowsheet for vital signs taken during this treatment. After treatment patient left in no apparent distress:   Sitting in chair and Call bell within reach    COMMUNICATION/EDUCATION:   The patients plan of care was discussed with: Registered nurse.      Fall prevention education was provided and the patient/caregiver indicated understanding. and Patient/family agree to work toward stated goals and plan of care.     Thank you for this referral.  Horace Batista   Time Calculation: 25 mins

## 2021-11-04 NOTE — DISCHARGE SUMMARY
Hospitalist Progress Note    Subjective:   Daily Progress Note: 11/4/2021 8:56 AM       Patient is a 60 y.o. year old female with past medical history of hypertension diabetes congestive heart failure obstructive sleep apnea came to emergency room because of slurred speech patient was talking to her landlord to pay her rent early this morning and the landlord noticed she have trouble speaking. The patient was sent to the ER seen by the ER physician CT scan of the head done shows old stroke patient was recommend to be admitted for further evaluation and treatment      11/4  Patient seen sitting up in bed eating breakfast. She states she feels well today with no complaints or concerns. No acute distress. No facial droop or weakness. No difficulty swallowing or with speech.  Denies chest pain, shortness of breath, abdominal pain, nausea or vomiting     Current Facility-Administered Medications   Medication Dose Route Frequency    atorvastatin (LIPITOR) tablet 40 mg  40 mg Oral DAILY    aspirin tablet 325 mg  325 mg Oral DAILY    hydrALAZINE (APRESOLINE) tablet 10 mg  10 mg Oral TID    busPIRone (BUSPAR) tablet 10 mg  10 mg Oral TID    losartan (COZAAR) tablet 100 mg  100 mg Oral DAILY    amLODIPine (NORVASC) tablet 10 mg  10 mg Oral DAILY    montelukast (SINGULAIR) tablet 10 mg  10 mg Oral DAILY    cholecalciferol (VITAMIN D3) (5000 Units/125 mcg) tablet 5,000 Units  5,000 Units Oral DAILY    insulin lispro (HUMALOG) injection   SubCUTAneous AC&HS    glucose chewable tablet 16 g  4 Tablet Oral PRN    dextrose (D50W) injection syrg 12.5-25 g  25-50 mL IntraVENous PRN    glucagon (GLUCAGEN) injection 1 mg  1 mg IntraMUSCular PRN    0.9% sodium chloride infusion  25 mL/hr IntraVENous CONTINUOUS    acetaminophen (TYLENOL) tablet 650 mg  650 mg Oral Q6H PRN    Or    acetaminophen (TYLENOL) suppository 650 mg  650 mg Rectal Q6H PRN    polyethylene glycol (MIRALAX) packet 17 g  17 g Oral DAILY PRN    ondansetron (ZOFRAN ODT) tablet 4 mg  4 mg Oral Q8H PRN    Or    ondansetron (ZOFRAN) injection 4 mg  4 mg IntraVENous Q6H PRN    enoxaparin (LOVENOX) injection 40 mg  40 mg SubCUTAneous DAILY          Objective:     Visit Vitals  BP (!) 111/59 (BP 1 Location: Left upper arm, BP Patient Position: At rest)   Pulse 65   Temp 97.4 °F (36.3 °C)   Resp 20   Ht 5' 2\" (1.575 m)   Wt 113.4 kg (250 lb)   SpO2 95%   BMI 45.73 kg/m²    O2 Flow Rate (L/min): 2 l/min O2 Device: Nasal cannula    Temp (24hrs), Av.7 °F (36.5 °C), Min:97.3 °F (36.3 °C), Max:98 °F (36.7 °C)      No intake/output data recorded. No intake/output data recorded. Physical Exam:   Constitutional: pt is oriented to person, place, and time. HENT:   Head: Normocephalic and atraumatic. Eyes: Pupils are equal, round, and reactive to light. EOM are normal.   Cardiovascular: Normal rate, regular rhythm and normal heart sounds. Pulmonary/Chest: Breath sounds normal. No wheezes. No rales. Exhibits no tenderness. Abdominal: Soft. Bowel sounds are normal. There is no abdominal tenderness. There is no rebound and no guarding. Musculoskeletal: Normal range of motion. Neurological: pt is alert and oriented to person, place, and time. Alert. Normal strength. No cranial nerve deficit or sensory deficit.  Displays a negative Romberg sign.         Data Review    Recent Results (from the past 24 hour(s))   GLUCOSE, POC    Collection Time: 21  5:22 PM   Result Value Ref Range    Glucose (POC) 78 65 - 117 mg/dL    Performed by [address] [place] [address], POC    Collection Time: 21  8:38 PM   Result Value Ref Range    Glucose (POC) 164 (H) 65 - 117 mg/dL    Performed by Jack Leong, POC    Collection Time: 21  6:51 AM   Result Value Ref Range    Glucose (POC) 106 65 - 117 mg/dL    Performed by Zarina Guillen    GLUCOSE, POC    Collection Time: 21 11:32 AM   Result Value Ref Range    Glucose (POC) 93 65 - 117 mg/dL Performed by Cristal Hernandez          Assessment/Plan:     TIA  Diabetes mellitus  Hypertension  CHF  Operative sleep apnea     1. TIA   Per Neuro   Episodic Facial Droop, Speech Difficulties, Likely Related To Recrudescence of Left Parietal Ischemia vs TIA   -Q6Hr NeuroChecks, TELE  -Stroke Prophylaxis: , Atorvastatin 40   -VTE Prophylaxis: Enoxaparin 40 daily  -Glucose Goal < 180  -Head of Bed at 30 degrees  -SBP Goal < 160    CT of head shows old stroke, but no acute findings  MRI Brain WO- no acute or subacute ischemia ,   CUS- no significant findings    HgbA1c- increased at 6.5  Lipid Panel- all within normal limits    Echo  · LV: Calculated LVEF is 61%. Normal cavity size, wall thickness, systolic function (ejection fraction normal) and diastolic function. · MV: Mild mitral annular calcification. Mild mitral valve regurgitation is present. · AV: Aortic valve leaflet calcification present. · IVC: Moderately elevated central venous pressure (8 mmHg); IVC diameter is larger than 21 mm and collapses more than 50% with respiration. Will continue patient on antiplatelet statin therapy for neurovascular prophylaxis. No further neurologic workup is necessary at the current time.     2. Hypertension              -well controlled              -103/66 today               - continue amlodipine, hydralazine and losartan      3.  Diabetes               -continue humalog      PT/OT consult  Speech following   Clear for discharge -continue statin and antiplatelets with discharge

## 2021-11-04 NOTE — PROGRESS NOTES
Discharge plan of care/case management plan validated with provider discharge order. Pt educated on discharge summary. Pt advised of follow-up appointment needed to be scheduled with addresses and phone number provided. Pt informed that no new medication were inicated and to continue medications as directed. Pt verbalized understanding.

## 2022-03-19 PROBLEM — G45.9 TIA (TRANSIENT ISCHEMIC ATTACK): Status: ACTIVE | Noted: 2021-11-02

## 2022-09-16 ENCOUNTER — TRANSCRIBE ORDER (OUTPATIENT)
Dept: SCHEDULING | Age: 62
End: 2022-09-16

## 2022-09-16 DIAGNOSIS — Z12.31 VISIT FOR SCREENING MAMMOGRAM: Primary | ICD-10-CM

## 2022-09-28 ENCOUNTER — HOSPITAL ENCOUNTER (OUTPATIENT)
Dept: MAMMOGRAPHY | Age: 62
Discharge: HOME | End: 2022-09-28
Attending: INTERNAL MEDICINE
Payer: MEDICARE

## 2022-09-28 DIAGNOSIS — Z12.31 VISIT FOR SCREENING MAMMOGRAM: ICD-10-CM

## 2022-09-28 PROCEDURE — 77063 BREAST TOMOSYNTHESIS BI: CPT

## 2023-05-25 RX ORDER — ASPIRIN 81 MG/1
81 TABLET ORAL DAILY
COMMUNITY

## 2023-05-25 RX ORDER — ATORVASTATIN CALCIUM 20 MG/1
20 TABLET, FILM COATED ORAL DAILY
COMMUNITY

## 2023-05-25 RX ORDER — BUSPIRONE HYDROCHLORIDE 10 MG/1
10 TABLET ORAL 3 TIMES DAILY
COMMUNITY

## 2023-05-25 RX ORDER — AMLODIPINE BESYLATE 10 MG/1
10 TABLET ORAL DAILY
COMMUNITY

## 2023-05-25 RX ORDER — HYDRALAZINE HYDROCHLORIDE 10 MG/1
10 TABLET, FILM COATED ORAL 3 TIMES DAILY
COMMUNITY

## 2023-05-25 RX ORDER — MONTELUKAST SODIUM 10 MG/1
10 TABLET ORAL DAILY
COMMUNITY

## 2023-05-25 RX ORDER — LOSARTAN POTASSIUM 100 MG/1
100 TABLET ORAL DAILY
COMMUNITY

## 2023-09-14 ENCOUNTER — TRANSCRIBE ORDERS (OUTPATIENT)
Facility: HOSPITAL | Age: 63
End: 2023-09-14

## 2023-09-14 DIAGNOSIS — Z12.31 SCREENING MAMMOGRAM FOR HIGH-RISK PATIENT: Primary | ICD-10-CM

## 2023-09-29 ENCOUNTER — HOSPITAL ENCOUNTER (OUTPATIENT)
Facility: HOSPITAL | Age: 63
End: 2023-09-29
Payer: MEDICARE

## 2023-09-29 DIAGNOSIS — Z12.31 SCREENING MAMMOGRAM FOR HIGH-RISK PATIENT: ICD-10-CM

## 2023-09-29 PROCEDURE — 77063 BREAST TOMOSYNTHESIS BI: CPT

## 2024-03-28 NOTE — PROGRESS NOTES
PT DAILY TREATMENT NOTE - [facility]     Patient Name: Magi Woods  Date:2021  : 1960  [x]  Patient  Verified  Payor: BLUE CROSS MEDICARE / Plan: [facility] HMO / Product Type: Managed Care Medicare /    Treatment Area: Low back pain [M54.5]  Cervicalgia [M54.2]   Next MD APPT: ? In time: 2:55 pm  Out time: 3:50  pm  Total Treatment Time (min): 55  Total Timed Codes (min): 50  1:1 Treatment Time ( [address] only): 50    Visit #:   Visit count could not be calculated. Make sure you are using a visit which is associated with an episode. SUBJECTIVE  Pain Level (0-10 scale) pre treatment: 3/10 with head turns only  Pain Level (0-10 scale) post treatment: 2-3/10  Any medication changes, allergies to medications, adverse drug reactions, diagnosis change, or new procedure performed?: [x] No    [] Yes (see summary sheet for update)  Subjective functional status/changes:   [] No changes reported  Patient stated she is feeling slight better, pain level is about 3/10 with head turns towards L>R due to R UT tightness and pain. Patient stated the tapping the right shoulder helped.        OBJECTIVE    Modality rationale: decrease edema, decrease inflammation, decrease pain and increase tissue extensibility to improve the patients ability to turn her head with ADLs   Min Type Additional Details    [] Estim: []UnAtt   []Att       []TENS instruct                  []IFC  []Premod   []NMES                     []Other:  []w/US   []w/ice   []w/heat  Position:  Location:     []  Ice     []  Heat  []  Ice massage Position:  Location:    []  Traction: [] Cervical       []Lumbar                       [] Prone          []Supine                       []Intermittent   []Continuous Lbs:  []w/heat  []W/heat and Estim    []  Ultrasound: []Continuous   [] Pulsed at:                           []1MHz   []3MHz Location:  W/cm2:      [] Skin assessment post-treatment:   []intact  []redness- no adverse reaction []redness - adverse reaction:      * Decline heat/ES today     30 min Therapeutic Exercise:  [x] See flow sheet :   Rationale: increase ROM, increase strength and improve coordination to improve the patients ability to turn neck without pain and discomfort during her ADLs    20 min Manual Therapy: B UT with rock tool and muscle energy techniques    Rationale: decrease pain, increase ROM, increase tissue extensibility and decrease trigger points to improve the patients ability to move neck with less discomfort      With   [x] TE   [] TA   [] Neuro   [] SC   [] other: Patient Education: [x] Review HEP    [] Progressed/Changed HEP based on:   [] positioning   [] body mechanics   [] transfers   [] Use of heat/ice    [] other:          Other Objective/Functional Measures:   Verbal and tactile cues to decreased B shoulder hike with neck ROM provided. Continue with POC - and new tape placed on right UT.     ASSESSMENT/Changes in Function:    Patient continue to progress with exercises, required verbal/tactile cues to decreased shoulders hiking and trunk compensation with exercises She was able to tolerate MT with rock tool on B UT and levator. Increase trigger points noted R > L today we continue to apply [employer] tape to R UT with instructions to remove if it increases pain or causes irritation. Also performed muscle energy to cervical spine in rotation and lateral flexion and extension. Increase in ROM noted with rotation. Patient became guarded with lateral flexion. Patient declined modalities today and reported decrease in pain post session. Patient will continue to benefit from skilled PT services to modify and progress therapeutic interventions, address functional mobility deficits, address ROM deficits, address strength deficits, analyze and address soft tissue restrictions, analyze and cue movement patterns and assess and modify postural abnormalities to attain remaining goals.      []  See Plan of Care  [] See progress note/recertification  []  See Discharge Summary         GOALS/Progress towards goals:  Short Term Goals: To be accomplished in 2-4  treatments:  1. Patient will be independent with HEP to progress with POC [x] Met [] Not met [] Partially met   2. Patient pain level with decreased by 2 points on the VAS pain scale </=4/10 [] Met [] Not met [] Partially met   3. Patient will improve neck SB/ROT ROM by 5-10 deg to improve neck mobility [] Met [] Not met [] Partially met      Long Term Goals: To be accomplished in 8  treatments:  1. Patient will be able to turn her head while driving with less pain and difficulty [] Met [] Not met [] Partially met   2. Patient will report less neck pain with her ADL's and IADL's to improve her mobility [] Met [] Not met [] Partially met   3. Patient will reports less neck pain and discomfort while sleeping to improve her quality of life. [] Met [] Not met [] Partially met   4 Patient will report no headaches or less frequent headaches to improve her quality of life.  [] Met [] Not met [] Partially met        PLAN  [x]  Upgrade activities as tolerated     [x]  Continue plan of care  []  Update interventions per flow sheet       []  Discharge due to:_  []  Other:_      Baron Turner, PT 2/11/2021 [Fever] : fever [Malaise] : malaise [Difficulty with Sleep] : difficulty with sleep [Ear Pain] : ear pain [Nasal Discharge] : nasal discharge [Nasal Congestion] : nasal congestion [Cough] : cough [Negative] : Skin

## 2024-05-17 ENCOUNTER — TRANSCRIBE ORDERS (OUTPATIENT)
Facility: HOSPITAL | Age: 64
End: 2024-05-17

## 2024-05-17 DIAGNOSIS — M81.0 AGE RELATED OSTEOPOROSIS, UNSPECIFIED PATHOLOGICAL FRACTURE PRESENCE: ICD-10-CM

## 2024-05-17 DIAGNOSIS — Z12.31 SCREENING MAMMOGRAM FOR BREAST CANCER: Primary | ICD-10-CM

## 2024-10-08 ENCOUNTER — HOSPITAL ENCOUNTER (OUTPATIENT)
Facility: HOSPITAL | Age: 64
Discharge: HOME | End: 2024-10-11
Payer: MEDICARE

## 2024-10-08 DIAGNOSIS — M81.0 AGE RELATED OSTEOPOROSIS, UNSPECIFIED PATHOLOGICAL FRACTURE PRESENCE: ICD-10-CM

## 2024-10-08 DIAGNOSIS — Z12.31 SCREENING MAMMOGRAM FOR BREAST CANCER: ICD-10-CM

## 2024-10-08 PROCEDURE — 77063 BREAST TOMOSYNTHESIS BI: CPT

## 2024-10-08 PROCEDURE — 77080 DXA BONE DENSITY AXIAL: CPT
